# Patient Record
Sex: FEMALE | Race: BLACK OR AFRICAN AMERICAN | URBAN - METROPOLITAN AREA
[De-identification: names, ages, dates, MRNs, and addresses within clinical notes are randomized per-mention and may not be internally consistent; named-entity substitution may affect disease eponyms.]

---

## 2023-10-19 ENCOUNTER — OFFICE VISIT (OUTPATIENT)
Dept: OBGYN CLINIC | Age: 33
End: 2023-10-19

## 2023-10-19 VITALS
DIASTOLIC BLOOD PRESSURE: 70 MMHG | WEIGHT: 190.7 LBS | HEIGHT: 67 IN | SYSTOLIC BLOOD PRESSURE: 118 MMHG | BODY MASS INDEX: 29.93 KG/M2

## 2023-10-19 DIAGNOSIS — Z32.01 POSITIVE PREGNANCY TEST: ICD-10-CM

## 2023-10-19 DIAGNOSIS — N92.6 MISSED PERIOD: Primary | ICD-10-CM

## 2023-10-19 LAB
HCG, PREGNANCY, URINE, POC: POSITIVE
VALID INTERNAL CONTROL, POC: YES

## 2023-10-19 RX ORDER — ONDANSETRON HYDROCHLORIDE 8 MG/1
8 TABLET, FILM COATED ORAL EVERY 8 HOURS PRN
Qty: 20 TABLET | Refills: 1 | Status: SHIPPED | OUTPATIENT
Start: 2023-10-19

## 2023-10-19 RX ORDER — DEXTROAMPHETAMINE SACCHARATE, AMPHETAMINE ASPARTATE MONOHYDRATE, DEXTROAMPHETAMINE SULFATE AND AMPHETAMINE SULFATE 3.75; 3.75; 3.75; 3.75 MG/1; MG/1; MG/1; MG/1
15 CAPSULE, EXTENDED RELEASE ORAL EVERY MORNING
COMMUNITY

## 2023-10-19 RX ORDER — GABAPENTIN 400 MG/1
600 CAPSULE ORAL 3 TIMES DAILY
COMMUNITY
Start: 2022-08-12

## 2023-10-19 RX ORDER — TRAMADOL HYDROCHLORIDE 50 MG/1
50 TABLET ORAL EVERY 6 HOURS PRN
COMMUNITY

## 2023-10-19 RX ORDER — FLUOXETINE HYDROCHLORIDE 20 MG/1
20 CAPSULE ORAL
COMMUNITY
Start: 2022-12-22

## 2023-10-19 RX ORDER — CLONAZEPAM 0.5 MG/1
0.5 TABLET ORAL 2 TIMES DAILY PRN
COMMUNITY

## 2023-10-19 RX ORDER — FLUOXETINE 10 MG/1
10 CAPSULE ORAL
COMMUNITY
Start: 2022-12-22

## 2023-10-19 NOTE — PROGRESS NOTES
The patient is a 29 y.o. No obstetric history on file. who is seen for {Symptoms; vaginal:60981}. Onset was {numbers; 0-10:72563} {unit:11} ago. Symptoms have been {course:17::\"unchanged\"} since. Associated symptoms include:  {Symptoms; gyn associated:67543}. HISTORY:    No obstetric history on file. No LMP recorded. Sexual History:  {Sexual Hx:5163}  Contraception:  {Contraception Methods:5051}  No current outpatient medications on file prior to visit. No current facility-administered medications on file prior to visit. ROS:  Feeling well. No dyspnea or chest pain on exertion. No abdominal pain, change in bowel habits, black or bloody stools. No urinary tract symptoms. GYN ROS: {gyn ros:871704}. PHYSICAL EXAM:  There were no vitals taken for this visit. The patient appears well, alert, oriented x 3, in no distress. Lungs are clear. Heart RRR, no murmurs. Abdomen soft without tenderness, guarding, mass or organomegaly. Pelvic: {pelvic exam:731481::\"normal external genitalia, vulva, vagina, cervix, uterus and adnexa\"}. ASSESSMENT:  No diagnosis found. PLAN:  {Gyn plan:78941}  No orders of the defined types were placed in this encounter. Supervising physician is Dr. Bran Trotter Greater than 50% of the *** minute visit were spent in counseling to the above topics.

## 2023-10-19 NOTE — PROGRESS NOTES
The patient is a 29 y.o. No obstetric history on file. who is seen to discuss her new pregnancy. Pt denies any current unilateral pain, cramping, urinary symptoms, or vaginal bleeding. She is currently taking an over the counter PNV with DHA and folic acid. She is on a variety of medications for anxiety and musculoskeletal/sciatic pain. States in last pregnancy stayed on gabapentin. She reports nausea, requests zofran rx.   Recent preg 3/2023  Reports monthly and regular periods. LMP: 2023  TIFFANIE based off of LMP: 5/10/2024  GA today: 10w 6 days    HISTORY:    No obstetric history on file. Patient's last menstrual period was 2023 (approximate). Sexual History:  has sex with males  Contraception:  none    Current Outpatient Medications on File Prior to Visit   Medication Sig Dispense Refill    FLUoxetine (PROZAC) 10 MG capsule Take 1 capsule by mouth      FLUoxetine (PROZAC) 20 MG capsule Take 1 capsule by mouth      gabapentin (NEURONTIN) 400 MG capsule Take 600 mg by mouth 3 times daily. amphetamine-dextroamphetamine (ADDERALL XR) 15 MG extended release capsule Take 1 capsule by mouth every morning. Max Daily Amount: 15 mg      traMADol (ULTRAM) 50 MG tablet Take 1 tablet by mouth every 6 hours as needed for Pain. Max Daily Amount: 200 mg      Prenatal Vit w/Nw-Qtsmulcmn-JC (PNV PO) Take by mouth      clonazePAM (KLONOPIN) 0.5 MG tablet Take 1 tablet by mouth 2 times daily as needed. Max Daily Amount: 1 mg       No current facility-administered medications on file prior to visit. ROS:  Feeling well. No dyspnea or chest pain on exertion. No abdominal pain, change in bowel habits, black or bloody stools. No urinary tract symptoms. GYN ROS: she complains of early pregnancy. PHYSICAL EXAM:  Blood pressure 118/70, height 1.702 m (5' 7\"), weight 86.5 kg (190 lb 11.2 oz), last menstrual period 2023. The patient appears well, alert, oriented x 3, in no distress.   Exam

## 2023-10-25 NOTE — PROGRESS NOTES
Gayathir Cruz is a 29 y.o. [id number] who is seen for ultrasound to evaluate pregnancy due to advanced dates. Reports monthly and regular periods  No vaginal bleeding or unilateral pain    Last visit had advised pt to come off Adderall, neurontin, tramadol, klonopin. Pt states she has decreased the dose and is working on coming off  Advised pt of risks in first trimester with medication exposure. We disc many of these meds, including tramadol, not recommended in first trimester. She uses this for her severe sciatic pain and is weaning down and has an appt with PCP to discuss alternative treatments  Again advised pt to come off these medications. New OB talk is scheduled for 2023. LMP: 2023  TIFFANIE based off of LMP: 5/10/2024  GA today: 11w6d    Ultrasound findings from today:  +FHT= 149   CRL is not consistent with TIFFANIE determined by LMP. TIFFANIE determined by today's ultrasound is 06/10/2023 with a GA of 7w3d. YS visualized. Uterus is retroverted and homogenous   ROV visualized with CL  LOV visualized and appears within normal limits. Trace FF seen in PCDS        HISTORY:    OB History          3    Para   2    Term   2            AB        Living   2         SAB        IAB        Ectopic        Molar        Multiple        Live Births   2               Patient's last menstrual period was 2023 (approximate). Social History     Substance and Sexual Activity   Sexual Activity Yes    Partners: Male    Birth control/protection: None        ROS:  Feeling well. No dyspnea or chest pain on exertion. No abdominal pain, change in bowel habits, black or bloody stools. No urinary tract symptoms. OB ROS: No contractions, vaginal bleeding , and vaginal leaking of fluid . PHYSICAL EXAM:  Blood pressure (!) 100/58, weight 88 kg (194 lb), last menstrual period 2023, not currently breastfeeding. The patient appears well, alert, oriented x 3.     Exam deferred, talk

## 2023-10-26 ENCOUNTER — ROUTINE PRENATAL (OUTPATIENT)
Dept: OBGYN CLINIC | Age: 33
End: 2023-10-26
Payer: MEDICAID

## 2023-10-26 VITALS — DIASTOLIC BLOOD PRESSURE: 58 MMHG | BODY MASS INDEX: 30.38 KG/M2 | WEIGHT: 194 LBS | SYSTOLIC BLOOD PRESSURE: 100 MMHG

## 2023-10-26 DIAGNOSIS — O36.80X0 ENCOUNTER TO DETERMINE FETAL VIABILITY OF PREGNANCY, SINGLE OR UNSPECIFIED FETUS: Primary | ICD-10-CM

## 2023-10-26 DIAGNOSIS — Z3A.01 7 WEEKS GESTATION OF PREGNANCY: ICD-10-CM

## 2023-10-26 DIAGNOSIS — Z79.899 MEDICATION MANAGEMENT: ICD-10-CM

## 2023-10-26 DIAGNOSIS — N92.6 MISSED PERIOD: ICD-10-CM

## 2023-10-26 PROCEDURE — 99213 OFFICE O/P EST LOW 20 MIN: CPT | Performed by: NURSE PRACTITIONER

## 2023-10-26 PROCEDURE — 76817 TRANSVAGINAL US OBSTETRIC: CPT | Performed by: NURSE PRACTITIONER

## 2023-10-26 RX ORDER — GABAPENTIN 600 MG/1
600 TABLET ORAL 3 TIMES DAILY
COMMUNITY

## 2023-11-07 ENCOUNTER — OFFICE VISIT (OUTPATIENT)
Dept: ORTHOPEDIC SURGERY | Age: 33
End: 2023-11-07

## 2023-11-07 VITALS — BODY MASS INDEX: 32.14 KG/M2 | HEIGHT: 66 IN | WEIGHT: 200 LBS

## 2023-11-07 DIAGNOSIS — M25.461 EFFUSION OF RIGHT KNEE: Primary | ICD-10-CM

## 2023-11-07 DIAGNOSIS — M25.561 RIGHT KNEE PAIN, UNSPECIFIED CHRONICITY: ICD-10-CM

## 2023-11-07 NOTE — PROGRESS NOTES
still in the first trimester we could consider an injection if she gets recurrent effusions when she is in the second or third trimester. We also discussed knee sleeve and compression and provided a home exercise quad strengthening program.  She elected to proceed with the aspiration today. After verbal informed consent was obtained the right knee was injected from a superior lateral  approach with 3 cc of 1% lidocaine on a 25-gauge needle through the skin and subcutaneous tissue down to the capsule. The knee was then aspirated from the superior lateral approach with an 18-gauge needle and a 60 cc syringe. 25  cc of normal-appearing synovial fluid was aspirated,. A compressive wrap was applied. The patient tolerated the procedure well and was given postinjection flare precautions. Marialuisa Hernández MD, [address] [facility]

## 2023-11-09 ENCOUNTER — INITIAL PRENATAL (OUTPATIENT)
Dept: OBGYN CLINIC | Age: 33
End: 2023-11-09
Payer: MEDICAID

## 2023-11-09 VITALS — BODY MASS INDEX: 31.6 KG/M2 | HEIGHT: 66 IN | WEIGHT: 196.6 LBS

## 2023-11-09 DIAGNOSIS — Z34.81 PRENATAL CARE, SUBSEQUENT PREGNANCY, FIRST TRIMESTER: ICD-10-CM

## 2023-11-09 DIAGNOSIS — O09.899 SHORT INTERVAL BETWEEN PREGNANCIES AFFECTING PREGNANCY, ANTEPARTUM: ICD-10-CM

## 2023-11-09 DIAGNOSIS — Z86.19 HISTORY OF SYPHILIS: ICD-10-CM

## 2023-11-09 DIAGNOSIS — Z72.0 CURRENT EVERY DAY NICOTINE VAPING: ICD-10-CM

## 2023-11-09 DIAGNOSIS — O26.841 UTERINE SIZE-DATE DISCREPANCY IN FIRST TRIMESTER: ICD-10-CM

## 2023-11-09 DIAGNOSIS — Z3A.09 9 WEEKS GESTATION OF PREGNANCY: ICD-10-CM

## 2023-11-09 DIAGNOSIS — F32.A ANXIETY AND DEPRESSION: ICD-10-CM

## 2023-11-09 DIAGNOSIS — O09.891 MEDICATION EXPOSURE DURING FIRST TRIMESTER OF PREGNANCY: Primary | ICD-10-CM

## 2023-11-09 DIAGNOSIS — F41.9 ANXIETY AND DEPRESSION: ICD-10-CM

## 2023-11-09 PROBLEM — M54.30 SCIATIC PAIN: Status: ACTIVE | Noted: 2023-11-09

## 2023-11-09 PROBLEM — F98.8 ADD (ATTENTION DEFICIT DISORDER): Status: ACTIVE | Noted: 2023-11-09

## 2023-11-09 PROBLEM — O09.90 HIGH-RISK PREGNANCY: Status: ACTIVE | Noted: 2023-11-09

## 2023-11-09 LAB
ABO + RH BLD: NORMAL
AMPHET UR QL SCN: NEGATIVE
BARBITURATES UR QL SCN: NEGATIVE
BASOPHILS # BLD: 0 K/UL (ref 0–0.2)
BASOPHILS NFR BLD: 0 % (ref 0–2)
BENZODIAZ UR QL: NEGATIVE
BLOOD GROUP ANTIBODIES SERPL: NORMAL
CANNABINOIDS UR QL SCN: NEGATIVE
COCAINE UR QL SCN: NEGATIVE
DIFFERENTIAL METHOD BLD: ABNORMAL
EOSINOPHIL # BLD: 0.1 K/UL (ref 0–0.8)
EOSINOPHIL NFR BLD: 2 % (ref 0.5–7.8)
ERYTHROCYTE [DISTWIDTH] IN BLOOD BY AUTOMATED COUNT: 13.5 % (ref 11.9–14.6)
HBV SURFACE AG SER QL: NONREACTIVE
HCT VFR BLD AUTO: 35.4 % (ref 35.8–46.3)
HCV AB SER QL: REACTIVE
HGB BLD-MCNC: 11.5 G/DL (ref 11.7–15.4)
HIV 1+2 AB+HIV1 P24 AG SERPL QL IA: NONREACTIVE
HIV 1/2 RESULT COMMENT: NORMAL
IMM GRANULOCYTES # BLD AUTO: 0 K/UL (ref 0–0.5)
IMM GRANULOCYTES NFR BLD AUTO: 0 % (ref 0–5)
LYMPHOCYTES # BLD: 2.3 K/UL (ref 0.5–4.6)
LYMPHOCYTES NFR BLD: 38 % (ref 13–44)
MCH RBC QN AUTO: 30.9 PG (ref 26.1–32.9)
MCHC RBC AUTO-ENTMCNC: 32.5 G/DL (ref 31.4–35)
MCV RBC AUTO: 95.2 FL (ref 82–102)
METHADONE UR QL: NEGATIVE
MONOCYTES # BLD: 0.4 K/UL (ref 0.1–1.3)
MONOCYTES NFR BLD: 7 % (ref 4–12)
NEUTS SEG # BLD: 3.1 K/UL (ref 1.7–8.2)
NEUTS SEG NFR BLD: 53 % (ref 43–78)
NRBC # BLD: 0 K/UL (ref 0–0.2)
OPIATES UR QL: NEGATIVE
PCP UR QL: NEGATIVE
PLATELET # BLD AUTO: 288 K/UL (ref 150–450)
PMV BLD AUTO: 9.7 FL (ref 9.4–12.3)
RBC # BLD AUTO: 3.72 M/UL (ref 4.05–5.2)
RUBV IGG SERPL IA-ACNC: 160.8 IU/ML
WBC # BLD AUTO: 6 K/UL (ref 4.3–11.1)

## 2023-11-09 PROCEDURE — 76817 TRANSVAGINAL US OBSTETRIC: CPT | Performed by: OBSTETRICS & GYNECOLOGY

## 2023-11-09 NOTE — PROGRESS NOTES
Lauren Moctezuma G3, P2 presents to the office today for NOB talk and ultrasound. EDC is 6/10/24 based off of US. Patient education was discussed including: nutrition, appropriate weight gain, diet, exercise, travel, hospital classes, breastfeeding/lactation services, flu vaccine, Tdap, glucola, GBS, and Corona Virus and Zika precautions. Genetic testing discussed in depth and patient elects NIPT/Carrier screen. Patients past medical history is significant for patient on multiple medications, that she has been taking throughout first trimester. She has appointment at PCP next week to discuss. Referral to [facility] sent for medication exposure. She was advised at last visit to stop all but Prozac and Zofran, but she is still taking all meds. She is advised today, that these medications are not considered safe for pregnancy. Delivered G2 in March of this year. States pregnancies have been normal.  Daily nicotine vaping-encouraged to quit. She is to return to the office in 3 weeks for NOB exam. All questions answered and she voiced full understanding. She is encouraged to call the office with any questions or concerns.

## 2023-11-10 LAB
RPR SER QL: NONREACTIVE
VZV IGG SER IA-ACNC: 1838 INDEX

## 2023-11-12 LAB
BACTERIA SPEC CULT: NORMAL
BACTERIA SPEC CULT: NORMAL
SERVICE CMNT-IMP: NORMAL

## 2023-11-13 LAB
HGB A MFR BLD: 97.3 % (ref 96.4–98.8)
HGB A2 MFR BLD COLUMN CHROM: 2.7 % (ref 1.8–3.2)
HGB F MFR BLD: 0 % (ref 0–2)
HGB FRACT BLD-IMP: NORMAL
HGB S MFR BLD: 0 %

## 2023-11-14 PROBLEM — R76.8 FALSE POSITIVE SEROLOGICAL TEST FOR HEPATITIS C: Status: ACTIVE | Noted: 2023-11-14

## 2023-11-14 LAB
HCV RNA SERPL NAA+PROBE-ACNC: NORMAL IU/ML
TEST INFORMATION: NORMAL

## 2023-11-28 ENCOUNTER — ROUTINE PRENATAL (OUTPATIENT)
Dept: OBGYN CLINIC | Age: 33
End: 2023-11-28
Payer: MEDICAID

## 2023-11-28 VITALS — DIASTOLIC BLOOD PRESSURE: 60 MMHG | SYSTOLIC BLOOD PRESSURE: 98 MMHG

## 2023-11-28 DIAGNOSIS — Z3A.12 12 WEEKS GESTATION OF PREGNANCY: ICD-10-CM

## 2023-11-28 DIAGNOSIS — O09.891 MEDICATION EXPOSURE DURING FIRST TRIMESTER OF PREGNANCY: Primary | ICD-10-CM

## 2023-11-28 DIAGNOSIS — O09.899 SHORT INTERVAL BETWEEN PREGNANCIES AFFECTING PREGNANCY, ANTEPARTUM: ICD-10-CM

## 2023-11-28 DIAGNOSIS — F98.8 ATTENTION DEFICIT DISORDER, UNSPECIFIED HYPERACTIVITY PRESENCE: ICD-10-CM

## 2023-11-28 DIAGNOSIS — O09.91 HIGH-RISK PREGNANCY IN FIRST TRIMESTER: ICD-10-CM

## 2023-11-28 DIAGNOSIS — M54.9 BACK PAIN AFFECTING PREGNANCY IN FIRST TRIMESTER: ICD-10-CM

## 2023-11-28 DIAGNOSIS — O99.331: ICD-10-CM

## 2023-11-28 DIAGNOSIS — O99.340 MATERNAL MENTAL DISORDER, ANTEPARTUM: ICD-10-CM

## 2023-11-28 DIAGNOSIS — O99.891 BACK PAIN AFFECTING PREGNANCY IN FIRST TRIMESTER: ICD-10-CM

## 2023-11-28 PROCEDURE — 76801 OB US < 14 WKS SINGLE FETUS: CPT | Performed by: OBSTETRICS & GYNECOLOGY

## 2023-11-28 PROCEDURE — 99204 OFFICE O/P NEW MOD 45 MIN: CPT | Performed by: OBSTETRICS & GYNECOLOGY

## 2023-11-28 PROCEDURE — 76813 OB US NUCHAL MEAS 1 GEST: CPT | Performed by: OBSTETRICS & GYNECOLOGY

## 2023-11-28 ASSESSMENT — PATIENT HEALTH QUESTIONNAIRE - PHQ9
SUM OF ALL RESPONSES TO PHQ QUESTIONS 1-9: 0
SUM OF ALL RESPONSES TO PHQ QUESTIONS 1-9: 0
2. FEELING DOWN, DEPRESSED OR HOPELESS: 0
SUM OF ALL RESPONSES TO PHQ9 QUESTIONS 1 & 2: 0
SUM OF ALL RESPONSES TO PHQ QUESTIONS 1-9: 0
SUM OF ALL RESPONSES TO PHQ QUESTIONS 1-9: 0
1. LITTLE INTEREST OR PLEASURE IN DOING THINGS: 0

## 2023-11-28 NOTE — PATIENT INSTRUCTIONS
Pregnancy Considerations   Tramadol crosses the placenta. Maternal use of opioids may be associated with poor fetal growth, stillbirth, and  delivery (CDC [[place] 6916]).  seizures and fetal death have been reported following maternal use of tramadol. Opioids used as part of obstetric analgesia/anesthesia during labor and delivery may temporarily affect the fetal heart rate (ACOG 2019).  abstinence syndrome (JODI)/ opioid withdrawal syndrome (NOWS) may occur following prolonged in utero exposure to opioids (CDC [[place] 9140]). JODI/NOWS may be life-threatening if not recognized and treated and requires management according to protocols developed by neonatology experts. Presentation of symptoms varies by opioid characteristics (eg, immediate release, sustained release), time of last dose prior to delivery, drug metabolism (maternal, placental, and infant), net placental transfer, as well as other factors (AAP [Yanelis 2012]; AAP [Carlos 0460]). Clinical signs characteristic of withdrawal following in utero opioid exposure include excessive crying or easily irritable, fragmented sleep (<2 to 3 hours after feeding), tremors, increased muscle tone, or GI dysfunction (hyperphagia, poor feeding, feeding intolerance, watery or loose stools) (Luis Rich 2022). JODI/NOWS occurs following chronic opioid exposure and would not be expected following the use of opioids at delivery (AAP [Carlos 2020]). Monitor infants of mothers on long-term/chronic opioid therapy for symptoms of withdrawal. Symptom onset reflects the half-life of the opioid used. Monitor infants for at least 3 days following exposure to immediate-release opioids; monitor for at least 4 to 7 days following exposure to sustained-release opioids (AAP [Carlos 2277]; [facility] [Justina 8108]). Monitor newborns for excess sedation and respiratory depression when opioids are used during labor.   When opioids are needed to treat acute pain in

## 2023-11-28 NOTE — PROGRESS NOTES
[address] [place] [address] FETAL MEDICINE    [address], [address] [place] [address]  P- [phone] [phone]         MFM Consultation    Presents for evaluation of the following chief complaint(s):   Chief Complaint   Patient presents with    High Risk Pregnancy     Med exposure, Anxiety/Depression, Short interval pregnancy    Pregnancy Ultrasound     NT         [address] (1990) is a 29 y.o. Dois Lade at 12w1d with 6/10/2024, by Ultrasound. Patient is a SAHM. Pt is scheduled to see Primary OB ([address]) on 11/30/23. No recent HA's. Has trace edema BLE. No bleeding or LOF. No contractions or cramping. No vaginal pressure recently. Pt c/o right knee pain (3 prior knee surgeries). Pt was see at [facility] on 11/7/23. Effusion noted and aspirated. Per  at [facility] if recurrent effusions can consider injections in 2nd and 3rd trimester. Mood evaluated today based on discussion with pt and PHQ screen. 11/28/2023     2:25 PM   PHQ-9    Little interest or pleasure in doing things 0   Feeling down, depressed, or hopeless 0   PHQ-2 Score 0   PHQ-9 Total Score 0      Mood Reassuring today    Personal and family history reviewed and updated as indicated. Addressed normal pregnancy complaints, reassured and offered suggestions for care  Reviewed gestational age precautions and activity goals/limitations  Nutritional counseling as well as specific goals based on current maternal and fetal status  Options for GERD, constipation, other common complaints reviewed. Reviewed gestational age appropriate preventive care regarding communicable disease transmission and vaccines as appropriate (including flu, TDaP >28wk, RSV 32-36wk, and COVID.)  Mood counseling today    Exam:     Vitals:    11/28/23 1518   BP: 66/74      Applicable labs reviewed. Please see formal ultrasound report under imaging tab.       ASSESSMENT/PLAN:  Patient Active Problem List    Diagnosis Date Noted    False

## 2023-11-30 ENCOUNTER — ROUTINE PRENATAL (OUTPATIENT)
Dept: OBGYN CLINIC | Age: 33
End: 2023-11-30
Payer: MEDICAID

## 2023-11-30 VITALS — WEIGHT: 192.2 LBS | SYSTOLIC BLOOD PRESSURE: 128 MMHG | DIASTOLIC BLOOD PRESSURE: 74 MMHG | BODY MASS INDEX: 31.02 KG/M2

## 2023-11-30 DIAGNOSIS — O36.8390 UNABLE TO HEAR FETAL HEART TONES AS REASON FOR ULTRASOUND SCAN: Primary | ICD-10-CM

## 2023-11-30 DIAGNOSIS — Z11.3 SCREENING EXAMINATION FOR STD (SEXUALLY TRANSMITTED DISEASE): ICD-10-CM

## 2023-11-30 DIAGNOSIS — Z34.81 PRENATAL CARE, SUBSEQUENT PREGNANCY IN FIRST TRIMESTER: ICD-10-CM

## 2023-11-30 DIAGNOSIS — Z3A.12 12 WEEKS GESTATION OF PREGNANCY: ICD-10-CM

## 2023-11-30 DIAGNOSIS — Z13.89 SCREENING FOR GENITOURINARY CONDITION: ICD-10-CM

## 2023-11-30 LAB
GLUCOSE URINE, POC: NEGATIVE
PROTEIN,URINE, POC: NORMAL

## 2023-11-30 PROCEDURE — 99214 OFFICE O/P EST MOD 30 MIN: CPT | Performed by: OBSTETRICS & GYNECOLOGY

## 2023-11-30 PROCEDURE — 76801 OB US < 14 WKS SINGLE FETUS: CPT | Performed by: OBSTETRICS & GYNECOLOGY

## 2023-11-30 PROCEDURE — 81002 URINALYSIS NONAUTO W/O SCOPE: CPT | Performed by: OBSTETRICS & GYNECOLOGY

## 2023-11-30 NOTE — PROGRESS NOTES
FHTS
accepted testing. Discussed diet, exercise and recommended weight gain. All questions answered. Encounter Diagnoses   Name Primary? Prenatal care, subsequent pregnancy in first trimester     12 weeks gestation of pregnancy     Screening for genitourinary condition     Unable to hear fetal heart tones as reason for ultrasound scan Yes     No follow-up provider specified.

## 2023-12-03 LAB
C TRACH RRNA SPEC QL NAA+PROBE: NEGATIVE
N GONORRHOEA RRNA SPEC QL NAA+PROBE: NEGATIVE
SPECIMEN SOURCE: NORMAL
T VAGINALIS RRNA SPEC QL NAA+PROBE: NEGATIVE

## 2023-12-12 LAB
Lab: NEGATIVE
Lab: NORMAL
NTRA CYSTIC FIBROSIS: NEGATIVE
NTRA DUCHENNE/BECKER MUSCULAR DYSTROPHY: NEGATIVE
NTRA FETAL FRACTION: NORMAL
NTRA FRAGILE X SYNDROME: NEGATIVE
NTRA GENDER OF FETUS: NORMAL
NTRA MONOSOMY X AGE-BASED RISK TEXT: NORMAL
NTRA MONOSOMY X RESULT TEXT: NORMAL
NTRA MONOSOMY X RISK SCORE TEXT: NORMAL
NTRA SPINAL MUSCULAR ATROPHY: NEGATIVE
NTRA TRIPLOIDY RESULT TEXT: NORMAL
NTRA TRISOMY 13 AGE-BASED RISK TEXT: NORMAL
NTRA TRISOMY 13 RESULT TEXT: NORMAL
NTRA TRISOMY 13 RISK SCORE TEXT: NORMAL
NTRA TRISOMY 18 AGE-BASED RISK TEXT: NORMAL
NTRA TRISOMY 18 RESULT TEXT: NORMAL
NTRA TRISOMY 18 RISK SCORE TEXT: NORMAL
NTRA TRISOMY 21 AGE-BASED RISK TEXT: NORMAL
NTRA TRISOMY 21 RESULT TEXT: NORMAL
NTRA TRISOMY 21 RISK SCORE TEXT: NORMAL

## 2023-12-27 ENCOUNTER — ROUTINE PRENATAL (OUTPATIENT)
Dept: OBGYN CLINIC | Age: 33
End: 2023-12-27
Payer: MEDICAID

## 2023-12-27 VITALS — SYSTOLIC BLOOD PRESSURE: 110 MMHG | WEIGHT: 190 LBS | DIASTOLIC BLOOD PRESSURE: 62 MMHG | BODY MASS INDEX: 30.67 KG/M2

## 2023-12-27 DIAGNOSIS — Z3A.16 16 WEEKS GESTATION OF PREGNANCY: ICD-10-CM

## 2023-12-27 DIAGNOSIS — O09.92 HIGH-RISK PREGNANCY IN SECOND TRIMESTER: Primary | ICD-10-CM

## 2023-12-27 DIAGNOSIS — Z13.89 SCREENING FOR GENITOURINARY CONDITION: ICD-10-CM

## 2023-12-27 LAB
GLUCOSE URINE, POC: NEGATIVE
PROTEIN,URINE, POC: NEGATIVE

## 2023-12-27 PROCEDURE — 81002 URINALYSIS NONAUTO W/O SCOPE: CPT | Performed by: OBSTETRICS & GYNECOLOGY

## 2023-12-27 PROCEDURE — 99213 OFFICE O/P EST LOW 20 MIN: CPT | Performed by: OBSTETRICS & GYNECOLOGY

## 2023-12-28 ENCOUNTER — ROUTINE PRENATAL (OUTPATIENT)
Dept: OBGYN CLINIC | Age: 33
End: 2023-12-28
Payer: MEDICAID

## 2023-12-28 VITALS — SYSTOLIC BLOOD PRESSURE: 118 MMHG | DIASTOLIC BLOOD PRESSURE: 75 MMHG

## 2023-12-28 DIAGNOSIS — M54.40 CHRONIC LOW BACK PAIN WITH SCIATICA, SCIATICA LATERALITY UNSPECIFIED, UNSPECIFIED BACK PAIN LATERALITY: ICD-10-CM

## 2023-12-28 DIAGNOSIS — G89.29 CHRONIC LOW BACK PAIN WITH SCIATICA, SCIATICA LATERALITY UNSPECIFIED, UNSPECIFIED BACK PAIN LATERALITY: ICD-10-CM

## 2023-12-28 DIAGNOSIS — O99.340 MATERNAL MENTAL DISORDER, ANTEPARTUM: ICD-10-CM

## 2023-12-28 DIAGNOSIS — O09.891 MEDICATION EXPOSURE DURING FIRST TRIMESTER OF PREGNANCY: ICD-10-CM

## 2023-12-28 DIAGNOSIS — O99.212 OBESITY AFFECTING PREGNANCY IN SECOND TRIMESTER, UNSPECIFIED OBESITY TYPE: ICD-10-CM

## 2023-12-28 DIAGNOSIS — E66.9 CLASS 1 OBESITY: ICD-10-CM

## 2023-12-28 DIAGNOSIS — F11.20 POLYSUBSTANCE DEPENDENCE INCLUDING OPIOID TYPE DRUG, CONTINUOUS USE (HCC): ICD-10-CM

## 2023-12-28 DIAGNOSIS — O99.331: ICD-10-CM

## 2023-12-28 DIAGNOSIS — O09.899 SHORT INTERVAL BETWEEN PREGNANCIES AFFECTING PREGNANCY, ANTEPARTUM: ICD-10-CM

## 2023-12-28 DIAGNOSIS — M54.9 BACK PAIN AFFECTING PREGNANCY IN FIRST TRIMESTER: ICD-10-CM

## 2023-12-28 DIAGNOSIS — R76.8 FALSE POSITIVE SEROLOGICAL TEST FOR HEPATITIS C: ICD-10-CM

## 2023-12-28 DIAGNOSIS — F19.20 POLYSUBSTANCE DEPENDENCE INCLUDING OPIOID TYPE DRUG, CONTINUOUS USE (HCC): ICD-10-CM

## 2023-12-28 DIAGNOSIS — F98.8 ATTENTION DEFICIT DISORDER, UNSPECIFIED HYPERACTIVITY PRESENCE: ICD-10-CM

## 2023-12-28 DIAGNOSIS — Z13.32 ENCOUNTER FOR SCREENING FOR MATERNAL DEPRESSION: Primary | ICD-10-CM

## 2023-12-28 DIAGNOSIS — O09.92 HIGH-RISK PREGNANCY IN SECOND TRIMESTER: ICD-10-CM

## 2023-12-28 DIAGNOSIS — O99.891 BACK PAIN AFFECTING PREGNANCY IN FIRST TRIMESTER: ICD-10-CM

## 2023-12-28 DIAGNOSIS — O09.892 SHORT INTERVAL BETWEEN PREGNANCIES AFFECTING PREGNANCY IN SECOND TRIMESTER, ANTEPARTUM: ICD-10-CM

## 2023-12-28 DIAGNOSIS — Z3A.16 16 WEEKS GESTATION OF PREGNANCY: ICD-10-CM

## 2023-12-28 DIAGNOSIS — Z86.19 HISTORY OF SYPHILIS: ICD-10-CM

## 2023-12-28 PROBLEM — M25.551 PAIN OF RIGHT HIP JOINT: Status: ACTIVE | Noted: 2021-07-30

## 2023-12-28 PROBLEM — F39 MOOD DISORDER (HCC): Status: ACTIVE | Noted: 2020-01-03

## 2023-12-28 PROBLEM — F19.11 HISTORY OF DRUG ABUSE (HCC): Status: ACTIVE | Noted: 2020-11-02

## 2023-12-28 PROBLEM — M54.50 CHRONIC LOW BACK PAIN: Status: ACTIVE | Noted: 2020-01-03

## 2023-12-28 PROCEDURE — 99215 OFFICE O/P EST HI 40 MIN: CPT | Performed by: OBSTETRICS & GYNECOLOGY

## 2023-12-28 PROCEDURE — 76805 OB US >/= 14 WKS SNGL FETUS: CPT | Performed by: OBSTETRICS & GYNECOLOGY

## 2023-12-28 PROCEDURE — 96127 BRIEF EMOTIONAL/BEHAV ASSMT: CPT | Performed by: OBSTETRICS & GYNECOLOGY

## 2023-12-28 PROCEDURE — 99415 PROLNG CLIN STAFF SVC 1ST HR: CPT | Performed by: OBSTETRICS & GYNECOLOGY

## 2023-12-28 RX ORDER — FAMOTIDINE 20 MG/1
20 TABLET, FILM COATED ORAL 2 TIMES DAILY
COMMUNITY

## 2023-12-28 RX ORDER — HYDROCODONE BITARTRATE AND ACETAMINOPHEN 5; 325 MG/1; MG/1
1 TABLET ORAL EVERY 8 HOURS PRN
COMMUNITY
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
## 2023-12-29 PROBLEM — F11.20 POLYSUBSTANCE DEPENDENCE INCLUDING OPIOID TYPE DRUG, CONTINUOUS USE (HCC): Status: ACTIVE | Noted: 2023-12-29

## 2023-12-29 PROBLEM — O09.892 SHORT INTERVAL BETWEEN PREGNANCIES AFFECTING PREGNANCY IN SECOND TRIMESTER, ANTEPARTUM: Status: ACTIVE | Noted: 2023-12-29

## 2023-12-29 PROBLEM — F19.20 POLYSUBSTANCE DEPENDENCE INCLUDING OPIOID TYPE DRUG, CONTINUOUS USE (HCC): Status: ACTIVE | Noted: 2023-12-29

## 2023-12-29 NOTE — PATIENT INSTRUCTIONS
If you have pelvic or back pain and discomfort that is NOT cramping or contractions, you can consider these things to help. Maternity support belt/K-tape (online instructional videos at www.[email or web address])  Heat/Cold (whichever soothes more)  Massage  Tylenol/biofreeze/similar  Pelvic Floor PT   Aquatic therapy   Chiropractic care  TENS unit can be considered. Resources for Depression/Anxiety  Postpartum Support International (PSI). PSI Warmline:  5-445-514-4PPD (1717). WWW. POSTPARTUM. NET    Mom's IMPACTT  [email or web address]

## 2023-12-29 NOTE — ASSESSMENT & PLAN NOTE
Short interval of pregnancy with less than 12 months between delivery and conception may be associated with maternal or fetal complications. Greatest risks are seen with interpregnancy interval (IPI) less than 6 months. 30 percent increase in risk of maternal anemia after IPI less than 6 months   In a 2006 meta-analysis including 10 studies, IPI less than 6 months was associated with a 60 percent increase in risk of low birth weight when compared with IPI of 18 to 23 months (pooled adjusted OR 1.61, 95% CI 1.39-1.86)  Increased risk of PPROM with IPI of 6 to 14 months and IPI less than 18 months   IPI ? 6 months were at 3.6-fold increased risk for early spontaneous PTB (less than 34 weeks), no association with late  birth  IPI of less than six months was associated with increased risk for placental abruption (OR 1.8, 95%CI 1.2-2.7)  Three-fold increase in risk of uterine rupture among women undergoing TOLAC with short inter-delivery interval up to 18 months

## 2024-01-24 ENCOUNTER — ROUTINE PRENATAL (OUTPATIENT)
Dept: OBGYN CLINIC | Age: 34
End: 2024-01-24
Payer: MEDICAID

## 2024-01-24 VITALS — DIASTOLIC BLOOD PRESSURE: 70 MMHG | WEIGHT: 196.2 LBS | SYSTOLIC BLOOD PRESSURE: 114 MMHG | BODY MASS INDEX: 31.67 KG/M2

## 2024-01-24 DIAGNOSIS — Z3A.20 20 WEEKS GESTATION OF PREGNANCY: ICD-10-CM

## 2024-01-24 DIAGNOSIS — F11.20 POLYSUBSTANCE DEPENDENCE INCLUDING OPIOID TYPE DRUG, CONTINUOUS USE (HCC): ICD-10-CM

## 2024-01-24 DIAGNOSIS — F19.11 HISTORY OF DRUG ABUSE (HCC): ICD-10-CM

## 2024-01-24 DIAGNOSIS — F19.20 POLYSUBSTANCE DEPENDENCE INCLUDING OPIOID TYPE DRUG, CONTINUOUS USE (HCC): ICD-10-CM

## 2024-01-24 DIAGNOSIS — O09.92 HIGH-RISK PREGNANCY IN SECOND TRIMESTER: Primary | ICD-10-CM

## 2024-01-24 DIAGNOSIS — Z13.89 SCREENING FOR GENITOURINARY CONDITION: ICD-10-CM

## 2024-01-24 LAB
AMPHET UR QL SCN: POSITIVE
BARBITURATES UR QL SCN: NEGATIVE
BENZODIAZ UR QL: POSITIVE
CANNABINOIDS UR QL SCN: POSITIVE
COCAINE UR QL SCN: NEGATIVE
GLUCOSE URINE, POC: NEGATIVE
METHADONE UR QL: NEGATIVE
OPIATES UR QL: POSITIVE
PCP UR QL: NEGATIVE
PROTEIN,URINE, POC: NEGATIVE

## 2024-01-24 PROCEDURE — 99213 OFFICE O/P EST LOW 20 MIN: CPT | Performed by: OBSTETRICS & GYNECOLOGY

## 2024-01-24 PROCEDURE — 81002 URINALYSIS NONAUTO W/O SCOPE: CPT | Performed by: OBSTETRICS & GYNECOLOGY

## 2024-01-24 NOTE — PROGRESS NOTES
Repeat UDS was sent from urine sample today- per [facility].   
has the potential to be dangerous as it can be a cause of seizures. The patient does not wish to stop taking clonazepam at this time. She was also counseled that polypharmacy is known to increase the risk for JODI. Recommend referral to NICU for consult at around 32 weeks gestation.    Pt with long history of ADHD. This has been well-controlled prior to pregnancy with adderall.    Patient currently uses many nonpharmacologic interventions without sufficient control to perform ADL sufficiently.   Most data on this class of medication is combined with research of illicit amphetamine use this clouds the ability to identify true impact on fetal development. Risk of growth lag, unknown long-term impact on fetal brain reviewed as well as potential small increase in risk of abdominal wall and limb reduction defects.. All questions answered.     Recommend prescribing for all these meds by either behavioral health or primary care provider in order to maintain consistent surveillance as will only see MFM sporadically.     12/28/23 [facility]: Taking medication for Chronic back and knee pain. See polysubstance overview.             Anxiety and Depression affecting pregnancy  11/09/2023     Takes Prozac 30 mg daily.  Klonopin 0.5 mg PRN BID.    11/28/23 [facility]: Reports stable mood.           ADD (attention deficit disorder) 11/09/2023     Taking Adderall XR 15 mg q day.       High-risk pregnancy in second trimester 11/09/2023     NIPT/Carrier screen:low risk   GTT:  Flu:  Tdap:    11/28/23 [facility]: Normal NT and nasal bone. For full details review formal ultrasound report. Genetic counseling done by MD. Pt desires NIPT and Carrier Screen, collect at [place] with Primary OB.    12/28/23 [facility]:  +FCA, IUP          Short interval between pregnancies affecting pregnancy, antepartum 11/09/2023     Vaginal delivery 3/2023      Back pain complicating pregnancy, second trimester 11/09/2023 11/28/23 [facility]: Takes Gabapentin 600 mg TID.        History

## 2024-01-25 NOTE — RESULT ENCOUNTER NOTE
Labs are abnormal she has Rx for above except THC.  Need to be sure understands policy and follow up on THC confirmation

## 2024-01-27 LAB
CANNABINOIDS UR QL CFM: POSITIVE
THC UR CFM-MCNC: 61 NG/ML

## 2024-01-29 ENCOUNTER — TELEPHONE (OUTPATIENT)
Dept: OBGYN CLINIC | Age: 34
End: 2024-01-29

## 2024-01-29 DIAGNOSIS — F12.90 MARIJUANA USE: Primary | ICD-10-CM

## 2024-01-30 ENCOUNTER — ROUTINE PRENATAL (OUTPATIENT)
Dept: OBGYN CLINIC | Age: 34
End: 2024-01-30
Payer: MEDICAID

## 2024-01-30 VITALS — SYSTOLIC BLOOD PRESSURE: 124 MMHG | DIASTOLIC BLOOD PRESSURE: 78 MMHG

## 2024-01-30 DIAGNOSIS — F11.20 POLYSUBSTANCE DEPENDENCE INCLUDING OPIOID TYPE DRUG, CONTINUOUS USE (HCC): ICD-10-CM

## 2024-01-30 DIAGNOSIS — O99.320 MARIJUANA USE DURING PREGNANCY: ICD-10-CM

## 2024-01-30 DIAGNOSIS — O99.212 OBESITY AFFECTING PREGNANCY IN SECOND TRIMESTER, UNSPECIFIED OBESITY TYPE: ICD-10-CM

## 2024-01-30 DIAGNOSIS — M54.9 BACK PAIN COMPLICATING PREGNANCY, SECOND TRIMESTER: ICD-10-CM

## 2024-01-30 DIAGNOSIS — O99.331: ICD-10-CM

## 2024-01-30 DIAGNOSIS — O09.892 SHORT INTERVAL BETWEEN PREGNANCIES AFFECTING PREGNANCY IN SECOND TRIMESTER, ANTEPARTUM: ICD-10-CM

## 2024-01-30 DIAGNOSIS — F12.90 MARIJUANA USE DURING PREGNANCY: ICD-10-CM

## 2024-01-30 DIAGNOSIS — Z3A.21 21 WEEKS GESTATION OF PREGNANCY: ICD-10-CM

## 2024-01-30 DIAGNOSIS — O99.891 BACK PAIN COMPLICATING PREGNANCY, SECOND TRIMESTER: ICD-10-CM

## 2024-01-30 DIAGNOSIS — Z13.32 ENCOUNTER FOR SCREENING FOR MATERNAL DEPRESSION: ICD-10-CM

## 2024-01-30 DIAGNOSIS — O09.891 MEDICATION EXPOSURE DURING FIRST TRIMESTER OF PREGNANCY: ICD-10-CM

## 2024-01-30 DIAGNOSIS — O09.899 SHORT INTERVAL BETWEEN PREGNANCIES AFFECTING PREGNANCY, ANTEPARTUM: ICD-10-CM

## 2024-01-30 DIAGNOSIS — O09.92 HIGH-RISK PREGNANCY IN SECOND TRIMESTER: Primary | ICD-10-CM

## 2024-01-30 DIAGNOSIS — F19.20 POLYSUBSTANCE DEPENDENCE INCLUDING OPIOID TYPE DRUG, CONTINUOUS USE (HCC): ICD-10-CM

## 2024-01-30 DIAGNOSIS — Z86.19 HISTORY OF SYPHILIS: ICD-10-CM

## 2024-01-30 DIAGNOSIS — O99.340 MATERNAL MENTAL DISORDER, ANTEPARTUM: ICD-10-CM

## 2024-01-30 PROCEDURE — 93325 DOPPLER ECHO COLOR FLOW MAPG: CPT | Performed by: OBSTETRICS & GYNECOLOGY

## 2024-01-30 PROCEDURE — 76811 OB US DETAILED SNGL FETUS: CPT | Performed by: OBSTETRICS & GYNECOLOGY

## 2024-01-30 PROCEDURE — 76827 ECHO EXAM OF FETAL HEART: CPT | Performed by: OBSTETRICS & GYNECOLOGY

## 2024-01-30 PROCEDURE — 96127 BRIEF EMOTIONAL/BEHAV ASSMT: CPT | Performed by: OBSTETRICS & GYNECOLOGY

## 2024-01-30 PROCEDURE — 99215 OFFICE O/P EST HI 40 MIN: CPT | Performed by: OBSTETRICS & GYNECOLOGY

## 2024-01-30 PROCEDURE — 76825 ECHO EXAM OF FETAL HEART: CPT | Performed by: OBSTETRICS & GYNECOLOGY

## 2024-01-30 ASSESSMENT — PATIENT HEALTH QUESTIONNAIRE - PHQ9
2. FEELING DOWN, DEPRESSED OR HOPELESS: 0
SUM OF ALL RESPONSES TO PHQ QUESTIONS 1-9: 0
1. LITTLE INTEREST OR PLEASURE IN DOING THINGS: 0
SUM OF ALL RESPONSES TO PHQ QUESTIONS 1-9: 0
SUM OF ALL RESPONSES TO PHQ9 QUESTIONS 1 & 2: 0

## 2024-01-30 NOTE — PROGRESS NOTES
[facility] MATERNAL FETAL MEDICINE    [address] [place] [address]  P- [phone]  [phone]       MFM Follow-up Visit  Lauren Moctezuma (1990) is a 33 y.o.  at 21w1d with 6/10/2024, by Ultrasound.      Presents for evaluation of the following chief complaint(s):   Chief Complaint   Patient presents with    High Risk Pregnancy     ADD, Anxiety/Depression, BMI >30, Chronic back pain, Medication exposure, Short interval pregnancy, THC use     Patient is a SAHM.     Patient is scheduled to see Primary OB ([facility]) on 24.    No recent HA's.  Has trace BLE edema , improves with rest/elevating legs. Denies Pre-eclamptic symptoms. No bleeding or LOF.  No contractions or cramping. No vaginal pressure recently. Reports good fetal movement. Reports acid reflux, improves with Pepcid.     Patient states she is tired today. She is continuing to take multiple medications as prescribed. We reviewed again today in detail risks for  withdrawal. Recommend  consultation ~32-34wk.   Additionally, pt vapes with substance including nicotine. Reviewed this will exacerbate  withdrawal.   Due to risks of growth lag, recommend growth with OB office monthly.     Mood evaluated today based on discussion with pt and PHQ screen.       2024     9:41 AM   PHQ-9    Little interest or pleasure in doing things 0   Feeling down, depressed, or hopeless 0   PHQ-2 Score 0   PHQ-9 Total Score 0      Mood Reassuring today  As mood stable and depression/anxiety well-controlled, will not change medications today.    Interval history since prior appt reviewed and updated as indicated.     Addressed normal pregnancy complaints, reassured and offered suggestions for care  Reviewed gestational age precautions and activity goals/limitations  Nutritional counseling as well as specific goals based on current maternal and fetal status  Options for GERD, constipation, other common complaints

## 2024-01-31 NOTE — PATIENT INSTRUCTIONS
If you have pelvic or back pain and discomfort that is NOT cramping or contractions, you can consider these things to help.     Maternity support belt/K-tape (online instructional videos at www.[email or web address])  Heat/Cold (whichever soothes more)  Massage  Tylenol/biofreeze/similar  Pelvic Floor PT   Aquatic therapy   Chiropractic care  TENS unit can be considered.        Resources for Depression/Anxiety  Postpartum Support International (PSI).    PSI Warmline:  2-629-755-4PPD (8384).  WWW.[email or web address].NET    Mom's IMPACTT  [email or web address]

## 2024-02-01 ENCOUNTER — FOLLOWUP TELEPHONE ENCOUNTER (OUTPATIENT)
Dept: CASE MANAGEMENT | Age: 34
End: 2024-02-01

## 2024-02-01 NOTE — TELEPHONE ENCOUNTER
Request received from [facility] to reach out to patient.  Phone call to patient at [phone].    Introduction made as OB Care Coordinator.      Patient denies any ongoing depression/anxiety.  Per patient, \"I'm fine.\"  She confirms that she was prescribed Prozac during her last pregnancy, and her PCP (Dr. Quispe) has continued prescribing this.  Patient feels that the Prozac manages her moods well.  Patient denied the need for any counseling/mental health resources.    Patient currently receives WIC for both herself and her 10 m/o son.  She does not feel that she would qualify for SNAP/Food [place] due to her boyfriend exceeding the income criteria.      Patient denied any needs at this time and is receptive to another call in several weeks.  Additionally, patient was encouraged to reach out if any needs/questions arise in the meantime.      BRITTANIE Valadez-LAZ, PMH-C  [facility]   [phone]

## 2024-02-22 NOTE — PROGRESS NOTES
33 y.o.  at 25w1d  who is here for routine OB visit.  Pt is doing well, with no bleeding or complications.     24: next mfm appt  MFM recommend monthly growth US    Urine drug screen pending today    1-hr diabetes test / CBC and growth US next visit    US findings from today:  EFW SECONDARY TO MEDICATION EXPOSURE, VAPING, SHORT INTERVAL PREGNANCY  JXJ=977  0V=61%  AC=39%  PLACENTA ANTERIOR GRADE 1  POSITION=CEPHALIC  AMINOTIC FLUID APPEARS WNL    HISTORY:  OB History    Para Term  AB Living   3 2 2     2   SAB IAB Ectopic Molar Multiple Live Births             2      # Outcome Date GA Lbr Juan/2nd Weight Sex Delivery Anes PTL Lv   3 Current            2 Term 23 39w4d / 00:21 3.681 kg (8 lb 1.8 oz) M Vag-Spont EPI N MIKE   1 Term 14 39w0d  3.118 kg (6 lb 14 oz) F Vag-Spont   MIKE      Patient's last menstrual period was 2023 (approximate).  Social History     Substance and Sexual Activity   Sexual Activity Yes    Partners: Male    Birth control/protection: None      Past Medical History:   Diagnosis Date    Abnormal Pap smear of cervix     states abn last yr-pt unsure of what trmt she had    ADD (attention deficit disorder)     Anxiety     Depression     Sciatic pain     Trauma       Past Surgical History:   Procedure Laterality Date    ORTHOPEDIC SURGERY      3 on right leg , 1 left leg       ROS:  Feeling well. No dyspnea or chest pain on exertion.  No abdominal pain, change in bowel habits, black or bloody stools.  No urinary tract symptoms.   OB ROS: No vaginal bleeding, cxns, LOF, decreased FM. No HA, vision changes, abdominal pain.    PHYSICAL EXAM:  /60   Wt 90.6 kg (199 lb 11.2 oz)   LMP 2023 (Approximate)   BMI 32.23 kg/m²        ASSESSMENT / PLAN:  1. Obesity affecting pregnancy in second trimester, unspecified obesity type  -     AMB POC OB URINE DIP  2. 24 weeks gestation of pregnancy  -     AMB POC OB URINE DIP  -     Urine Drug Screen; Future  -

## 2024-02-27 ENCOUNTER — ROUTINE PRENATAL (OUTPATIENT)
Dept: OBGYN CLINIC | Age: 34
End: 2024-02-27
Payer: MEDICAID

## 2024-02-27 ENCOUNTER — PROCEDURE VISIT (OUTPATIENT)
Dept: OBGYN CLINIC | Age: 34
End: 2024-02-27
Payer: MEDICAID

## 2024-02-27 ENCOUNTER — FOLLOWUP TELEPHONE ENCOUNTER (OUTPATIENT)
Dept: CASE MANAGEMENT | Age: 34
End: 2024-02-27

## 2024-02-27 VITALS — WEIGHT: 199.7 LBS | DIASTOLIC BLOOD PRESSURE: 60 MMHG | SYSTOLIC BLOOD PRESSURE: 102 MMHG | BODY MASS INDEX: 32.23 KG/M2

## 2024-02-27 DIAGNOSIS — O09.92 HIGH-RISK PREGNANCY IN SECOND TRIMESTER: ICD-10-CM

## 2024-02-27 DIAGNOSIS — Z3A.24 24 WEEKS GESTATION OF PREGNANCY: ICD-10-CM

## 2024-02-27 DIAGNOSIS — O09.92 HIGH-RISK PREGNANCY IN SECOND TRIMESTER: Primary | ICD-10-CM

## 2024-02-27 DIAGNOSIS — Z13.89 SCREENING FOR GENITOURINARY CONDITION: ICD-10-CM

## 2024-02-27 DIAGNOSIS — O99.320 MARIJUANA USE DURING PREGNANCY: ICD-10-CM

## 2024-02-27 DIAGNOSIS — O09.892 SHORT INTERVAL BETWEEN PREGNANCIES AFFECTING PREGNANCY IN SECOND TRIMESTER, ANTEPARTUM: ICD-10-CM

## 2024-02-27 DIAGNOSIS — F12.90 MARIJUANA USE DURING PREGNANCY: ICD-10-CM

## 2024-02-27 DIAGNOSIS — O99.212 OBESITY AFFECTING PREGNANCY IN SECOND TRIMESTER, UNSPECIFIED OBESITY TYPE: Primary | ICD-10-CM

## 2024-02-27 DIAGNOSIS — O09.891 MEDICATION EXPOSURE DURING FIRST TRIMESTER OF PREGNANCY: ICD-10-CM

## 2024-02-27 DIAGNOSIS — Z3A.25 25 WEEKS GESTATION OF PREGNANCY: ICD-10-CM

## 2024-02-27 DIAGNOSIS — O99.331: ICD-10-CM

## 2024-02-27 LAB
AMPHET UR QL SCN: NEGATIVE
BARBITURATES UR QL SCN: NEGATIVE
BENZODIAZ UR QL: POSITIVE
CANNABINOIDS UR QL SCN: POSITIVE
COCAINE UR QL SCN: NEGATIVE
GLUCOSE URINE, POC: NEGATIVE
METHADONE UR QL: NEGATIVE
OPIATES UR QL: POSITIVE
PCP UR QL: NEGATIVE
PROTEIN,URINE, POC: NORMAL

## 2024-02-27 PROCEDURE — 99213 OFFICE O/P EST LOW 20 MIN: CPT | Performed by: STUDENT IN AN ORGANIZED HEALTH CARE EDUCATION/TRAINING PROGRAM

## 2024-02-27 PROCEDURE — 81002 URINALYSIS NONAUTO W/O SCOPE: CPT | Performed by: STUDENT IN AN ORGANIZED HEALTH CARE EDUCATION/TRAINING PROGRAM

## 2024-02-27 PROCEDURE — 76816 OB US FOLLOW-UP PER FETUS: CPT | Performed by: STUDENT IN AN ORGANIZED HEALTH CARE EDUCATION/TRAINING PROGRAM

## 2024-02-28 NOTE — TELEPHONE ENCOUNTER
Phone call to patient at [phone] to check-in.    Patient states that overall, she's been doing well since our last conversation.  She shared that her vehicle was recently repaired, so she has no concerns about transportation to/from doctor appointments.    Patient denied any needs at this time and is receptive to another call in 1 month.  SW also encouraged patient to reach out if any needs/questions arise.    BRITTANIE aVladez-LAZ, [facility]-C  [facility]   [phone]

## 2024-03-05 LAB — CANNABINOIDS UR QL CFM: NEGATIVE

## 2024-03-20 ENCOUNTER — PROCEDURE VISIT (OUTPATIENT)
Dept: OBGYN CLINIC | Age: 34
End: 2024-03-20
Payer: MEDICAID

## 2024-03-20 ENCOUNTER — ROUTINE PRENATAL (OUTPATIENT)
Dept: OBGYN CLINIC | Age: 34
End: 2024-03-20
Payer: MEDICAID

## 2024-03-20 VITALS — WEIGHT: 197 LBS | BODY MASS INDEX: 31.8 KG/M2 | SYSTOLIC BLOOD PRESSURE: 116 MMHG | DIASTOLIC BLOOD PRESSURE: 64 MMHG

## 2024-03-20 DIAGNOSIS — Z11.3 SCREENING FOR VENEREAL DISEASE (VD): ICD-10-CM

## 2024-03-20 DIAGNOSIS — Z13.0 SCREENING, ANEMIA, DEFICIENCY, IRON: ICD-10-CM

## 2024-03-20 DIAGNOSIS — O09.891 MEDICATION EXPOSURE DURING FIRST TRIMESTER OF PREGNANCY: Primary | ICD-10-CM

## 2024-03-20 DIAGNOSIS — Z34.83 PRENATAL CARE, SUBSEQUENT PREGNANCY, THIRD TRIMESTER: Primary | ICD-10-CM

## 2024-03-20 DIAGNOSIS — O99.212 OBESITY AFFECTING PREGNANCY IN SECOND TRIMESTER, UNSPECIFIED OBESITY TYPE: ICD-10-CM

## 2024-03-20 DIAGNOSIS — Z13.1 SCREENING FOR DIABETES MELLITUS: ICD-10-CM

## 2024-03-20 DIAGNOSIS — O99.331: ICD-10-CM

## 2024-03-20 DIAGNOSIS — Z3A.28 28 WEEKS GESTATION OF PREGNANCY: ICD-10-CM

## 2024-03-20 DIAGNOSIS — Z13.89 SCREENING FOR GENITOURINARY CONDITION: ICD-10-CM

## 2024-03-20 DIAGNOSIS — O09.892 SHORT INTERVAL BETWEEN PREGNANCIES AFFECTING PREGNANCY IN SECOND TRIMESTER, ANTEPARTUM: ICD-10-CM

## 2024-03-20 LAB
ERYTHROCYTE [DISTWIDTH] IN BLOOD BY AUTOMATED COUNT: 13.2 % (ref 11.9–14.6)
GLUCOSE 1 HOUR: 91 MG/DL
GLUCOSE URINE, POC: NEGATIVE
HCT VFR BLD AUTO: 34.2 % (ref 35.8–46.3)
HGB BLD-MCNC: 11 G/DL (ref 11.7–15.4)
MCH RBC QN AUTO: 30.5 PG (ref 26.1–32.9)
MCHC RBC AUTO-ENTMCNC: 32.2 G/DL (ref 31.4–35)
MCV RBC AUTO: 94.7 FL (ref 82–102)
NRBC # BLD: 0 K/UL (ref 0–0.2)
PLATELET # BLD AUTO: 367 K/UL (ref 150–450)
PMV BLD AUTO: 10.3 FL (ref 9.4–12.3)
PROTEIN,URINE, POC: NEGATIVE
RBC # BLD AUTO: 3.61 M/UL (ref 4.05–5.2)
WBC # BLD AUTO: 9.5 K/UL (ref 4.3–11.1)

## 2024-03-20 PROCEDURE — 99214 OFFICE O/P EST MOD 30 MIN: CPT | Performed by: OBSTETRICS & GYNECOLOGY

## 2024-03-20 PROCEDURE — 76816 OB US FOLLOW-UP PER FETUS: CPT | Performed by: OBSTETRICS & GYNECOLOGY

## 2024-03-20 PROCEDURE — 81002 URINALYSIS NONAUTO W/O SCOPE: CPT | Performed by: OBSTETRICS & GYNECOLOGY

## 2024-03-20 RX ORDER — FLUCONAZOLE 100 MG/1
100 TABLET ORAL DAILY
Qty: 2 TABLET | Refills: 0 | Status: SHIPPED | OUTPATIENT
Start: 2024-03-20 | End: 2024-03-24

## 2024-03-20 NOTE — PROGRESS NOTES
Glucoal, CBC today  Sees MFM next month for substance abuse reviewed note  Anatomy complete but PAULA has decreased to 7.2  EFW 2-15, measures 1 weeks ahead BPP 8/8  Will repeat in 2 weeks advised more water  Had strep throat 2 weeks ago, took po antiBx now with yeast; sent diflucan Rx

## 2024-03-21 ENCOUNTER — TELEPHONE (OUTPATIENT)
Dept: OBGYN CLINIC | Age: 34
End: 2024-03-21

## 2024-03-21 DIAGNOSIS — O09.92 HIGH-RISK PREGNANCY IN SECOND TRIMESTER: Primary | ICD-10-CM

## 2024-03-21 LAB — RPR SER QL: NONREACTIVE

## 2024-03-21 NOTE — TELEPHONE ENCOUNTER
----- Message from Federico Salazar MD sent at 3/21/2024  8:27 AM EDT -----  Regarding: glu hgb  Passed glucola  Hgb low otf 11  Needs Slow Fe every other day  ----- Message -----  From: Bart Franklin Incoming [place] [facility]  Sent: 3/20/2024   4:38 PM EDT  To: Federico Salazar MD

## 2024-03-27 ENCOUNTER — FOLLOWUP TELEPHONE ENCOUNTER (OUTPATIENT)
Dept: CASE MANAGEMENT | Age: 34
End: 2024-03-27

## 2024-03-27 NOTE — TELEPHONE ENCOUNTER
Phone call to patient at [phone] to check-in.    Patient feels like her current psychiatric medications are working well.  She is taking Prozac daily and Klonopin (2-3x/day).      Overall, patient states that she's doing well without any needs at this time.    Patient agreeable for  to call back and check-in.  Additionally, patient was encouraged to reach out if any needs/questions arise in the meantime.      BRITTANIE Valadez-LAZ, PMH-C  [facility]   [phone]

## 2024-04-02 ENCOUNTER — ROUTINE PRENATAL (OUTPATIENT)
Dept: OBGYN CLINIC | Age: 34
End: 2024-04-02
Payer: MEDICAID

## 2024-04-02 ENCOUNTER — PROCEDURE VISIT (OUTPATIENT)
Dept: OBGYN CLINIC | Age: 34
End: 2024-04-02
Payer: MEDICAID

## 2024-04-02 VITALS — SYSTOLIC BLOOD PRESSURE: 136 MMHG | BODY MASS INDEX: 33.09 KG/M2 | WEIGHT: 205 LBS | DIASTOLIC BLOOD PRESSURE: 62 MMHG

## 2024-04-02 DIAGNOSIS — O09.93 HIGH-RISK PREGNANCY IN THIRD TRIMESTER: Primary | ICD-10-CM

## 2024-04-02 DIAGNOSIS — O09.892 SHORT INTERVAL BETWEEN PREGNANCIES AFFECTING PREGNANCY IN SECOND TRIMESTER, ANTEPARTUM: ICD-10-CM

## 2024-04-02 DIAGNOSIS — Z3A.30 30 WEEKS GESTATION OF PREGNANCY: ICD-10-CM

## 2024-04-02 DIAGNOSIS — F11.20 POLYSUBSTANCE DEPENDENCE INCLUDING OPIOID TYPE DRUG, CONTINUOUS USE (HCC): ICD-10-CM

## 2024-04-02 DIAGNOSIS — O09.891 MEDICATION EXPOSURE DURING FIRST TRIMESTER OF PREGNANCY: ICD-10-CM

## 2024-04-02 DIAGNOSIS — O28.8 AFI (AMNIOTIC FLUID INDEX) BORDERLINE LOW: Primary | ICD-10-CM

## 2024-04-02 DIAGNOSIS — O99.212 OBESITY AFFECTING PREGNANCY IN SECOND TRIMESTER, UNSPECIFIED OBESITY TYPE: ICD-10-CM

## 2024-04-02 DIAGNOSIS — O99.331: ICD-10-CM

## 2024-04-02 DIAGNOSIS — F19.20 POLYSUBSTANCE DEPENDENCE INCLUDING OPIOID TYPE DRUG, CONTINUOUS USE (HCC): ICD-10-CM

## 2024-04-02 DIAGNOSIS — Z13.89 SCREENING FOR GENITOURINARY CONDITION: ICD-10-CM

## 2024-04-02 LAB
GLUCOSE URINE, POC: NEGATIVE
PROTEIN,URINE, POC: NORMAL

## 2024-04-02 PROCEDURE — 81002 URINALYSIS NONAUTO W/O SCOPE: CPT | Performed by: OBSTETRICS & GYNECOLOGY

## 2024-04-02 PROCEDURE — 76819 FETAL BIOPHYS PROFIL W/O NST: CPT | Performed by: OBSTETRICS & GYNECOLOGY

## 2024-04-02 PROCEDURE — 99213 OFFICE O/P EST LOW 20 MIN: CPT | Performed by: OBSTETRICS & GYNECOLOGY
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
## 2024-04-12 ENCOUNTER — PROCEDURE VISIT (OUTPATIENT)
Dept: OBGYN CLINIC | Age: 34
End: 2024-04-12
Payer: MEDICAID

## 2024-04-12 ENCOUNTER — ROUTINE PRENATAL (OUTPATIENT)
Dept: OBGYN CLINIC | Age: 34
End: 2024-04-12

## 2024-04-12 VITALS — BODY MASS INDEX: 32.99 KG/M2 | WEIGHT: 204.4 LBS | DIASTOLIC BLOOD PRESSURE: 76 MMHG | SYSTOLIC BLOOD PRESSURE: 130 MMHG

## 2024-04-12 DIAGNOSIS — O09.891 MEDICATION EXPOSURE DURING FIRST TRIMESTER OF PREGNANCY: ICD-10-CM

## 2024-04-12 DIAGNOSIS — O09.892 SHORT INTERVAL BETWEEN PREGNANCIES AFFECTING PREGNANCY IN SECOND TRIMESTER, ANTEPARTUM: ICD-10-CM

## 2024-04-12 DIAGNOSIS — O09.93 HIGH-RISK PREGNANCY IN THIRD TRIMESTER: ICD-10-CM

## 2024-04-12 DIAGNOSIS — Z3A.31 31 WEEKS GESTATION OF PREGNANCY: ICD-10-CM

## 2024-04-12 DIAGNOSIS — Z13.89 SCREENING FOR GENITOURINARY CONDITION: ICD-10-CM

## 2024-04-12 DIAGNOSIS — O09.92 HIGH-RISK PREGNANCY IN SECOND TRIMESTER: Primary | ICD-10-CM

## 2024-04-12 DIAGNOSIS — F11.20 POLYSUBSTANCE DEPENDENCE INCLUDING OPIOID TYPE DRUG, CONTINUOUS USE (HCC): Primary | ICD-10-CM

## 2024-04-12 DIAGNOSIS — F19.20 POLYSUBSTANCE DEPENDENCE INCLUDING OPIOID TYPE DRUG, CONTINUOUS USE (HCC): Primary | ICD-10-CM

## 2024-04-12 LAB
GLUCOSE URINE, POC: NEGATIVE
PROTEIN,URINE, POC: NORMAL

## 2024-04-12 PROCEDURE — 76819 FETAL BIOPHYS PROFIL W/O NST: CPT | Performed by: OBSTETRICS & GYNECOLOGY

## 2024-04-12 NOTE — PROGRESS NOTES
Patient Active Problem List    Diagnosis Date Noted    Polysubstance dependence including opioid type drug, continuous use (HCC) 2023     Priority: High     23 [facility]: Currently taking: hydrocodone 5/325 (90 Rx monthly by PCP), Klonopin 0.5mg (90 Rx by PCP monthly), adderall 15mg XR BID (60 tab Rx by PCP monthly), gabapentin 600mg TID ( 90tab Rx by PCP monthly), fluoxetine 10-30mg daily (Rx prescribed by multiple PCPs monthly). Previously on tramadol 50mg (120Rx by PCP monthly until 2023).     HIGH risk for  withdrawal with polysubstance use exacerbated by nicotine use from vaping.   Counseled today to minimize use, change to less impactful medications. Nonpharmacologic options for mood and pain reviewed orally and in writing.     Last UDS negative for all substances in November.     24 [facility]: UDS on 24 positive for Benzo, Amphetamine, Opiates and THC. Has appointment mid February with , PCP who manages above medications.   Recommend lowest doses possible; risks of vaping in pregnancy reviewed.   Probability of  withdrawal again reviewed. Plan Shaun consult ~34 weeks.       Short interval between pregnancies affecting pregnancy in second trimester, antepartum 2023     Priority: Medium     - 3/2023  TA Cervical eval reassuring     24 [facility]: CL-3.0 cm. Denies vaginal pressure.       Marijuana use during pregnancy 2024 UDS negative.   2024 UDS positive. Titer=61 Made aware of policy(my chart)  2024 repeat UDS- neg    24 [facility]: + UDS for THC. Reports using Delta 8 products not THC. Pt encouraged to d/c use. Referred to [facility]. NEEDS CORD SEGMENT AT DELIVERY.      Obesity affecting pregnancy in second trimester 2023    False positive serological test for hepatitis C 2023     Confirmatory testing was negative 23.      Medication exposure during first trimester of pregnancy 2023

## 2024-04-16 ENCOUNTER — ROUTINE PRENATAL (OUTPATIENT)
Dept: OBGYN CLINIC | Age: 34
End: 2024-04-16
Payer: MEDICAID

## 2024-04-16 ENCOUNTER — CLINICAL DOCUMENTATION (OUTPATIENT)
Dept: OBGYN CLINIC | Age: 34
End: 2024-04-16

## 2024-04-16 ENCOUNTER — OFFICE VISIT (OUTPATIENT)
Dept: OBGYN CLINIC | Age: 34
End: 2024-04-16

## 2024-04-16 VITALS — SYSTOLIC BLOOD PRESSURE: 113 MMHG | DIASTOLIC BLOOD PRESSURE: 73 MMHG | HEART RATE: 91 BPM

## 2024-04-16 DIAGNOSIS — O99.331: ICD-10-CM

## 2024-04-16 DIAGNOSIS — M54.9 BACK PAIN COMPLICATING PREGNANCY IN THIRD TRIMESTER: ICD-10-CM

## 2024-04-16 DIAGNOSIS — O09.93 HIGH-RISK PREGNANCY IN THIRD TRIMESTER: Primary | ICD-10-CM

## 2024-04-16 DIAGNOSIS — O09.891 MEDICATION EXPOSURE DURING FIRST TRIMESTER OF PREGNANCY: ICD-10-CM

## 2024-04-16 DIAGNOSIS — O09.92 HIGH-RISK PREGNANCY IN SECOND TRIMESTER: Primary | ICD-10-CM

## 2024-04-16 DIAGNOSIS — O99.340 MATERNAL MENTAL DISORDER, ANTEPARTUM: ICD-10-CM

## 2024-04-16 DIAGNOSIS — F19.11 HISTORY OF DRUG ABUSE (HCC): ICD-10-CM

## 2024-04-16 DIAGNOSIS — F11.20 POLYSUBSTANCE DEPENDENCE INCLUDING OPIOID TYPE DRUG, CONTINUOUS USE (HCC): ICD-10-CM

## 2024-04-16 DIAGNOSIS — O99.320 MARIJUANA USE DURING PREGNANCY: ICD-10-CM

## 2024-04-16 DIAGNOSIS — O99.213 OBESITY AFFECTING PREGNANCY IN THIRD TRIMESTER, UNSPECIFIED OBESITY TYPE: ICD-10-CM

## 2024-04-16 DIAGNOSIS — F19.20 POLYSUBSTANCE DEPENDENCE INCLUDING OPIOID TYPE DRUG, CONTINUOUS USE (HCC): ICD-10-CM

## 2024-04-16 DIAGNOSIS — Z3A.32 32 WEEKS GESTATION OF PREGNANCY: ICD-10-CM

## 2024-04-16 DIAGNOSIS — O99.013 ANEMIA DURING PREGNANCY IN THIRD TRIMESTER: ICD-10-CM

## 2024-04-16 DIAGNOSIS — F98.8 ATTENTION DEFICIT DISORDER, UNSPECIFIED HYPERACTIVITY PRESENCE: ICD-10-CM

## 2024-04-16 DIAGNOSIS — F12.90 MARIJUANA USE DURING PREGNANCY: ICD-10-CM

## 2024-04-16 DIAGNOSIS — O09.893 SHORT INTERVAL BETWEEN PREGNANCIES AFFECTING PREGNANCY IN THIRD TRIMESTER, ANTEPARTUM: ICD-10-CM

## 2024-04-16 DIAGNOSIS — M25.551 PAIN OF RIGHT HIP JOINT: ICD-10-CM

## 2024-04-16 DIAGNOSIS — F39 MOOD DISORDER (HCC): ICD-10-CM

## 2024-04-16 DIAGNOSIS — O99.891 BACK PAIN COMPLICATING PREGNANCY IN THIRD TRIMESTER: ICD-10-CM

## 2024-04-16 PROCEDURE — 99214 OFFICE O/P EST MOD 30 MIN: CPT | Performed by: OBSTETRICS & GYNECOLOGY

## 2024-04-16 PROCEDURE — 76826 ECHO EXAM OF FETAL HEART: CPT | Performed by: OBSTETRICS & GYNECOLOGY

## 2024-04-16 PROCEDURE — 76819 FETAL BIOPHYS PROFIL W/O NST: CPT | Performed by: OBSTETRICS & GYNECOLOGY

## 2024-04-16 PROCEDURE — 76816 OB US FOLLOW-UP PER FETUS: CPT | Performed by: OBSTETRICS & GYNECOLOGY

## 2024-04-16 PROCEDURE — 76828 ECHO EXAM OF FETAL HEART: CPT | Performed by: OBSTETRICS & GYNECOLOGY

## 2024-04-16 PROCEDURE — 93325 DOPPLER ECHO COLOR FLOW MAPG: CPT | Performed by: OBSTETRICS & GYNECOLOGY

## 2024-04-16 RX ORDER — OXYCODONE HYDROCHLORIDE 5 MG/1
5 CAPSULE ORAL EVERY 4 HOURS PRN
COMMUNITY
End: 2024-05-14

## 2024-04-16 ASSESSMENT — PATIENT HEALTH QUESTIONNAIRE - PHQ9
SUM OF ALL RESPONSES TO PHQ QUESTIONS 1-9: 0
2. FEELING DOWN, DEPRESSED OR HOPELESS: NOT AT ALL
SUM OF ALL RESPONSES TO PHQ9 QUESTIONS 1 & 2: 0
SUM OF ALL RESPONSES TO PHQ QUESTIONS 1-9: 0
1. LITTLE INTEREST OR PLEASURE IN DOING THINGS: NOT AT ALL

## 2024-04-16 NOTE — PROGRESS NOTES
Consult scheduled with Dr Ruffin on 24 at 1100 for medication exposure.  Scheduled with Cameron. Dr Ruffin aware.
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
## 2024-04-16 NOTE — PATIENT INSTRUCTIONS
Resources for Depression/Anxiety  Postpartum Support International (PSI).    PSI Warmline:  8-621-436-4PPD (6646).  WWW.[email or web address].NET    Mom's IMPACTT  [email or web address]       In order to optimize maternal, fetal, and  health, we recommend the following vaccinations.   Flu- yearly (https://www.highriskpregnancyinfo.org/flu-facts-for-pregnancy)  Consider Covid vaccination/booster (https://www.highriskpregnancyinfo.org/covid-19-pregnancy)  TDaP after 28 weeks each pregnancy (https://www.highriskpregnancyinfo.org/tdap)  Consider RSV vaccine 32-36 weeks of pregnancy, if Sept- January.  (https://www.highriskpregnancyinfo.org/rsv)

## 2024-04-25 ENCOUNTER — PROCEDURE VISIT (OUTPATIENT)
Dept: OBGYN CLINIC | Age: 34
End: 2024-04-25
Payer: MEDICAID

## 2024-04-25 ENCOUNTER — ROUTINE PRENATAL (OUTPATIENT)
Dept: OBGYN CLINIC | Age: 34
End: 2024-04-25
Payer: MEDICAID

## 2024-04-25 VITALS — WEIGHT: 207.2 LBS | DIASTOLIC BLOOD PRESSURE: 76 MMHG | BODY MASS INDEX: 33.44 KG/M2 | SYSTOLIC BLOOD PRESSURE: 112 MMHG

## 2024-04-25 DIAGNOSIS — Z13.89 SCREENING FOR GENITOURINARY CONDITION: ICD-10-CM

## 2024-04-25 DIAGNOSIS — F19.20 POLYSUBSTANCE DEPENDENCE INCLUDING OPIOID TYPE DRUG, CONTINUOUS USE (HCC): Primary | ICD-10-CM

## 2024-04-25 DIAGNOSIS — Z34.83 PRENATAL CARE, SUBSEQUENT PREGNANCY, THIRD TRIMESTER: Primary | ICD-10-CM

## 2024-04-25 DIAGNOSIS — O09.893 SHORT INTERVAL BETWEEN PREGNANCIES AFFECTING PREGNANCY IN THIRD TRIMESTER, ANTEPARTUM: ICD-10-CM

## 2024-04-25 DIAGNOSIS — O09.93 HIGH-RISK PREGNANCY IN THIRD TRIMESTER: ICD-10-CM

## 2024-04-25 DIAGNOSIS — Z3A.33 33 WEEKS GESTATION OF PREGNANCY: ICD-10-CM

## 2024-04-25 DIAGNOSIS — F11.20 POLYSUBSTANCE DEPENDENCE INCLUDING OPIOID TYPE DRUG, CONTINUOUS USE (HCC): Primary | ICD-10-CM

## 2024-04-25 LAB
GLUCOSE URINE, POC: NEGATIVE
PROTEIN,URINE, POC: NEGATIVE

## 2024-04-25 PROCEDURE — 76819 FETAL BIOPHYS PROFIL W/O NST: CPT | Performed by: OBSTETRICS & GYNECOLOGY

## 2024-04-25 PROCEDURE — 99213 OFFICE O/P EST LOW 20 MIN: CPT | Performed by: OBSTETRICS & GYNECOLOGY

## 2024-04-25 PROCEDURE — 81002 URINALYSIS NONAUTO W/O SCOPE: CPT | Performed by: OBSTETRICS & GYNECOLOGY

## 2024-04-25 NOTE — PROGRESS NOTES
Patient Active Problem List    Diagnosis Date Noted    Polysubstance dependence including opioid type drug, continuous use (HCC) 2023     Priority: High     23 [facility]: Currently taking: hydrocodone 5/325 (90 Rx monthly by PCP), Klonopin 0.5mg (90 Rx by PCP monthly), adderall 15mg XR BID (60 tab Rx by PCP monthly), gabapentin 600mg TID ( 90tab Rx by PCP monthly), fluoxetine 10-30mg daily (Rx prescribed by multiple PCPs monthly). Previously on tramadol 50mg (120Rx by PCP monthly until 2023).     HIGH risk for  withdrawal with polysubstance use exacerbated by nicotine use from vaping.   Counseled today to minimize use, change to less impactful medications. Nonpharmacologic options for mood and pain reviewed orally and in writing.     Last UDS negative for all substances in November.     24 [facility]: UDS on 24 positive for Benzo, Amphetamine, Opiates and THC. Has appointment mid February with , PCP who manages above medications.   Recommend lowest doses possible; risks of vaping in pregnancy reviewed.   Probability of  withdrawal again reviewed. Plan Shaun consult ~34 weeks.     24 [facility]: Reports currently taking Klonopin, Adderall, Oxycodone, Gabapentin and Prozac daily. Scheduled Shaun Consult with  for 24 at 1100.         Short interval between pregnancies affecting pregnancy in third trimester, antepartum 2023     Priority: Medium     - 3/2023  TA Cervical eval reassuring     24 [facility]: CL-3.0 cm. Denies vaginal pressure.       Anemia during pregnancy in third trimester 2024     3/20/24: Hgb- 11.0.    24 [facility]: Reports taking iron supplement daily.         Marijuana use during pregnancy 2024 UDS negative.   2024 UDS positive. Titer=61 Made aware of policy(my chart)  2024 repeat UDS- neg    24 [facility]: + UDS for THC. Reports using Delta 8 products not THC. Pt encouraged to d/c use. Referred to

## 2024-04-26 ENCOUNTER — FOLLOWUP TELEPHONE ENCOUNTER (OUTPATIENT)
Dept: CASE MANAGEMENT | Age: 34
End: 2024-04-26

## 2024-04-26 ENCOUNTER — ROUTINE PRENATAL (OUTPATIENT)
Dept: OBGYN CLINIC | Age: 34
End: 2024-04-26
Payer: MEDICAID

## 2024-04-26 ENCOUNTER — PROCEDURE VISIT (OUTPATIENT)
Dept: OBGYN CLINIC | Age: 34
End: 2024-04-26
Payer: MEDICAID

## 2024-04-26 VITALS — WEIGHT: 206.4 LBS | DIASTOLIC BLOOD PRESSURE: 72 MMHG | SYSTOLIC BLOOD PRESSURE: 110 MMHG | BODY MASS INDEX: 33.31 KG/M2

## 2024-04-26 DIAGNOSIS — Z3A.33 33 WEEKS GESTATION OF PREGNANCY: ICD-10-CM

## 2024-04-26 DIAGNOSIS — O28.8 AFI (AMNIOTIC FLUID INDEX) BORDERLINE LOW: Primary | ICD-10-CM

## 2024-04-26 DIAGNOSIS — O09.93 HIGH-RISK PREGNANCY IN THIRD TRIMESTER: Primary | ICD-10-CM

## 2024-04-26 DIAGNOSIS — Z13.89 SCREENING FOR GENITOURINARY CONDITION: ICD-10-CM

## 2024-04-26 LAB
GLUCOSE URINE, POC: NEGATIVE
PROTEIN,URINE, POC: NEGATIVE

## 2024-04-26 PROCEDURE — 81002 URINALYSIS NONAUTO W/O SCOPE: CPT | Performed by: OBSTETRICS & GYNECOLOGY

## 2024-04-26 PROCEDURE — 76819 FETAL BIOPHYS PROFIL W/O NST: CPT | Performed by: OBSTETRICS & GYNECOLOGY

## 2024-04-26 PROCEDURE — 99212 OFFICE O/P EST SF 10 MIN: CPT | Performed by: OBSTETRICS & GYNECOLOGY

## 2024-04-26 NOTE — PROGRESS NOTES
Patient continues a good fetal movement.  Ultrasound today shows PAULA of 10.8.  BPP 8/8.  Discussed with patient this is a normal level and this is very reassuring.  Continue having her hydrate.  She is scheduled for her NICU consult on Tuesday we will see her back Thursday or Friday with a repeat BPP to follow fluid.  Given her strict fetal movement precautions that if she notices any change to report to the hospital or let us know.

## 2024-04-26 NOTE — TELEPHONE ENCOUNTER
Phone call to patient at [phone] to check-in.    Overall, patient states that she's doing well at this time.  She is taking Prozac daily and Klonopin, Oxycodone, and Adderall.  Per patient, she was switched from Hydrocodone to Oxycodone for increased pain management and to \"not take all the Tylenol.\"  She reports that the Oxycodone is working better than the Hydrocodone.    Patient is meeting with our Neonatologist on Tuesday, 4/30/24 for NICU consultation.      Patient denied any needs at this time.  Patient agreeable for  to call back and check-in.  SW encouraged patient to reach out if any needs/questions arise in the meantime.      BRITTANIE Valadez-LAZ, PMH-C  [facility]   [phone]

## 2024-05-03 ENCOUNTER — PROCEDURE VISIT (OUTPATIENT)
Dept: OBGYN CLINIC | Age: 34
End: 2024-05-03
Payer: MEDICAID

## 2024-05-03 ENCOUNTER — ROUTINE PRENATAL (OUTPATIENT)
Dept: OBGYN CLINIC | Age: 34
End: 2024-05-03
Payer: MEDICAID

## 2024-05-03 VITALS — WEIGHT: 203.2 LBS | DIASTOLIC BLOOD PRESSURE: 64 MMHG | SYSTOLIC BLOOD PRESSURE: 108 MMHG | BODY MASS INDEX: 32.8 KG/M2

## 2024-05-03 DIAGNOSIS — Z3A.34 34 WEEKS GESTATION OF PREGNANCY: ICD-10-CM

## 2024-05-03 DIAGNOSIS — Z34.83 PRENATAL CARE, SUBSEQUENT PREGNANCY, THIRD TRIMESTER: Primary | ICD-10-CM

## 2024-05-03 DIAGNOSIS — Z13.89 SCREENING FOR GENITOURINARY CONDITION: ICD-10-CM

## 2024-05-03 DIAGNOSIS — O28.8 AFI (AMNIOTIC FLUID INDEX) BORDERLINE LOW: Primary | ICD-10-CM

## 2024-05-03 DIAGNOSIS — F11.20 POLYSUBSTANCE DEPENDENCE INCLUDING OPIOID TYPE DRUG, CONTINUOUS USE (HCC): ICD-10-CM

## 2024-05-03 DIAGNOSIS — O09.893 SHORT INTERVAL BETWEEN PREGNANCIES AFFECTING PREGNANCY IN THIRD TRIMESTER, ANTEPARTUM: ICD-10-CM

## 2024-05-03 DIAGNOSIS — O09.891 MEDICATION EXPOSURE DURING FIRST TRIMESTER OF PREGNANCY: ICD-10-CM

## 2024-05-03 DIAGNOSIS — F19.20 POLYSUBSTANCE DEPENDENCE INCLUDING OPIOID TYPE DRUG, CONTINUOUS USE (HCC): ICD-10-CM

## 2024-05-03 LAB
GLUCOSE URINE, POC: NEGATIVE
PROTEIN,URINE, POC: NEGATIVE

## 2024-05-03 PROCEDURE — 81002 URINALYSIS NONAUTO W/O SCOPE: CPT | Performed by: OBSTETRICS & GYNECOLOGY

## 2024-05-03 PROCEDURE — 76819 FETAL BIOPHYS PROFIL W/O NST: CPT | Performed by: OBSTETRICS & GYNECOLOGY

## 2024-05-03 PROCEDURE — 99214 OFFICE O/P EST MOD 30 MIN: CPT | Performed by: OBSTETRICS & GYNECOLOGY

## 2024-05-03 NOTE — PROGRESS NOTES
Discussed anemia, taking Fe pills and additional dietary Fe  She is seeing MFM ( reviewed notes) in 1 1/2 weeks for multiple medications, reviewed those. Will get BPP early next week. Cont PNVs.

## 2024-05-10 ENCOUNTER — ROUTINE PRENATAL (OUTPATIENT)
Dept: OBGYN CLINIC | Age: 34
End: 2024-05-10

## 2024-05-10 ENCOUNTER — PROCEDURE VISIT (OUTPATIENT)
Dept: OBGYN CLINIC | Age: 34
End: 2024-05-10
Payer: MEDICAID

## 2024-05-10 VITALS — BODY MASS INDEX: 32.77 KG/M2 | WEIGHT: 203 LBS | DIASTOLIC BLOOD PRESSURE: 64 MMHG | SYSTOLIC BLOOD PRESSURE: 110 MMHG

## 2024-05-10 DIAGNOSIS — O99.891 BACK PAIN COMPLICATING PREGNANCY IN THIRD TRIMESTER: ICD-10-CM

## 2024-05-10 DIAGNOSIS — F19.20 POLYSUBSTANCE DEPENDENCE INCLUDING OPIOID TYPE DRUG, CONTINUOUS USE (HCC): ICD-10-CM

## 2024-05-10 DIAGNOSIS — O09.93 HIGH-RISK PREGNANCY IN THIRD TRIMESTER: ICD-10-CM

## 2024-05-10 DIAGNOSIS — Z3A.35 35 WEEKS GESTATION OF PREGNANCY: ICD-10-CM

## 2024-05-10 DIAGNOSIS — O09.893 SHORT INTERVAL BETWEEN PREGNANCIES AFFECTING PREGNANCY IN THIRD TRIMESTER, ANTEPARTUM: ICD-10-CM

## 2024-05-10 DIAGNOSIS — F11.20 POLYSUBSTANCE DEPENDENCE INCLUDING OPIOID TYPE DRUG, CONTINUOUS USE (HCC): ICD-10-CM

## 2024-05-10 DIAGNOSIS — O09.891 MEDICATION EXPOSURE DURING FIRST TRIMESTER OF PREGNANCY: ICD-10-CM

## 2024-05-10 DIAGNOSIS — R76.8 FALSE POSITIVE SEROLOGICAL TEST FOR HEPATITIS C: ICD-10-CM

## 2024-05-10 DIAGNOSIS — O99.340 MATERNAL MENTAL DISORDER, ANTEPARTUM: ICD-10-CM

## 2024-05-10 DIAGNOSIS — Z86.19 HISTORY OF SYPHILIS: ICD-10-CM

## 2024-05-10 DIAGNOSIS — F12.90 MARIJUANA USE DURING PREGNANCY: ICD-10-CM

## 2024-05-10 DIAGNOSIS — F19.11 HISTORY OF DRUG ABUSE (HCC): ICD-10-CM

## 2024-05-10 DIAGNOSIS — O99.320 MARIJUANA USE DURING PREGNANCY: ICD-10-CM

## 2024-05-10 DIAGNOSIS — M25.551 PAIN OF RIGHT HIP JOINT: ICD-10-CM

## 2024-05-10 DIAGNOSIS — Z34.83 PRENATAL CARE, SUBSEQUENT PREGNANCY, THIRD TRIMESTER: Primary | ICD-10-CM

## 2024-05-10 DIAGNOSIS — Z13.89 SCREENING FOR GENITOURINARY CONDITION: ICD-10-CM

## 2024-05-10 DIAGNOSIS — F39 MOOD DISORDER (HCC): ICD-10-CM

## 2024-05-10 DIAGNOSIS — O09.891 MEDICATION EXPOSURE DURING FIRST TRIMESTER OF PREGNANCY: Primary | ICD-10-CM

## 2024-05-10 DIAGNOSIS — O99.013 ANEMIA DURING PREGNANCY IN THIRD TRIMESTER: ICD-10-CM

## 2024-05-10 DIAGNOSIS — M54.9 BACK PAIN COMPLICATING PREGNANCY IN THIRD TRIMESTER: ICD-10-CM

## 2024-05-10 LAB
GLUCOSE URINE, POC: NEGATIVE
PROTEIN,URINE, POC: NEGATIVE

## 2024-05-10 PROCEDURE — 76819 FETAL BIOPHYS PROFIL W/O NST: CPT | Performed by: OBSTETRICS & GYNECOLOGY

## 2024-05-10 NOTE — PROGRESS NOTES
BPP 8/8 PAULA 10.4 stable  Needs GBS next  Substance abuse - THC and multiple meds- Sees MFM on Tuesday  Anemia- Taking Fe pills ok ( no constipation), cont dietary supplementation.   Anxiety/depression- stable on prozac/klonopin

## 2024-05-14 ENCOUNTER — ROUTINE PRENATAL (OUTPATIENT)
Dept: OBGYN CLINIC | Age: 34
End: 2024-05-14
Payer: MEDICAID

## 2024-05-14 VITALS — SYSTOLIC BLOOD PRESSURE: 118 MMHG | DIASTOLIC BLOOD PRESSURE: 72 MMHG

## 2024-05-14 DIAGNOSIS — O99.213 OBESITY AFFECTING PREGNANCY IN THIRD TRIMESTER, UNSPECIFIED OBESITY TYPE: ICD-10-CM

## 2024-05-14 DIAGNOSIS — O09.93 HIGH-RISK PREGNANCY IN THIRD TRIMESTER: Primary | ICD-10-CM

## 2024-05-14 DIAGNOSIS — O99.340 MATERNAL MENTAL DISORDER, ANTEPARTUM: ICD-10-CM

## 2024-05-14 DIAGNOSIS — F12.90 MARIJUANA USE DURING PREGNANCY: ICD-10-CM

## 2024-05-14 DIAGNOSIS — F11.20 POLYSUBSTANCE DEPENDENCE INCLUDING OPIOID TYPE DRUG, CONTINUOUS USE (HCC): ICD-10-CM

## 2024-05-14 DIAGNOSIS — F98.8 ATTENTION DEFICIT DISORDER, UNSPECIFIED HYPERACTIVITY PRESENCE: ICD-10-CM

## 2024-05-14 DIAGNOSIS — Z86.19 HISTORY OF SYPHILIS: ICD-10-CM

## 2024-05-14 DIAGNOSIS — O99.320 MARIJUANA USE DURING PREGNANCY: ICD-10-CM

## 2024-05-14 DIAGNOSIS — M25.551 PAIN OF RIGHT HIP JOINT: ICD-10-CM

## 2024-05-14 DIAGNOSIS — O09.891 MEDICATION EXPOSURE DURING FIRST TRIMESTER OF PREGNANCY: ICD-10-CM

## 2024-05-14 DIAGNOSIS — M54.9 BACK PAIN COMPLICATING PREGNANCY IN THIRD TRIMESTER: ICD-10-CM

## 2024-05-14 DIAGNOSIS — O99.891 BACK PAIN COMPLICATING PREGNANCY IN THIRD TRIMESTER: ICD-10-CM

## 2024-05-14 DIAGNOSIS — Z3A.36 36 WEEKS GESTATION OF PREGNANCY: ICD-10-CM

## 2024-05-14 DIAGNOSIS — F39 MOOD DISORDER (HCC): ICD-10-CM

## 2024-05-14 DIAGNOSIS — O09.893 SHORT INTERVAL BETWEEN PREGNANCIES AFFECTING PREGNANCY IN THIRD TRIMESTER, ANTEPARTUM: ICD-10-CM

## 2024-05-14 DIAGNOSIS — F19.20 POLYSUBSTANCE DEPENDENCE INCLUDING OPIOID TYPE DRUG, CONTINUOUS USE (HCC): ICD-10-CM

## 2024-05-14 DIAGNOSIS — F19.11 HISTORY OF DRUG ABUSE (HCC): ICD-10-CM

## 2024-05-14 DIAGNOSIS — R76.8 FALSE POSITIVE SEROLOGICAL TEST FOR HEPATITIS C: ICD-10-CM

## 2024-05-14 DIAGNOSIS — O99.013 ANEMIA DURING PREGNANCY IN THIRD TRIMESTER: ICD-10-CM

## 2024-05-14 PROCEDURE — 99214 OFFICE O/P EST MOD 30 MIN: CPT | Performed by: OBSTETRICS & GYNECOLOGY

## 2024-05-14 PROCEDURE — 76819 FETAL BIOPHYS PROFIL W/O NST: CPT | Performed by: OBSTETRICS & GYNECOLOGY

## 2024-05-14 PROCEDURE — 76816 OB US FOLLOW-UP PER FETUS: CPT | Performed by: OBSTETRICS & GYNECOLOGY

## 2024-05-14 RX ORDER — OXYCODONE HYDROCHLORIDE 5 MG/1
TABLET ORAL
COMMUNITY
Start: 2024-05-10

## 2024-05-14 ASSESSMENT — PATIENT HEALTH QUESTIONNAIRE - PHQ9
SUM OF ALL RESPONSES TO PHQ QUESTIONS 1-9: 0
1. LITTLE INTEREST OR PLEASURE IN DOING THINGS: NOT AT ALL
2. FEELING DOWN, DEPRESSED OR HOPELESS: NOT AT ALL
SUM OF ALL RESPONSES TO PHQ QUESTIONS 1-9: 0
SUM OF ALL RESPONSES TO PHQ9 QUESTIONS 1 & 2: 0
SUM OF ALL RESPONSES TO PHQ QUESTIONS 1-9: 0
SUM OF ALL RESPONSES TO PHQ QUESTIONS 1-9: 0

## 2024-05-14 NOTE — PATIENT INSTRUCTIONS
Resources for Depression/Anxiety  Postpartum Support International (PSI).    PSI Warmline:  2-192-059-4PPD (9916).  WWW.[email or web address].NET    Mom's IMPACTT  [email or web address]-[email or web address]      Suicide & Crisis Lifeline  Dial 988    National Pregnancy Registry for Psychiatric Medications  National Pregnancy Registry for Psychiatric Medications  - [facility] ([facility].org)        In order to optimize maternal, fetal, and  health, we recommend the following vaccinations.   Flu- yearly (https://www.[employer] [facility].org/flu-facts-for-pregnancy)  Consider Covid vaccination/booster (https://www.[employer] [facility]regnancyinfo.org/covid-19-pregnancy)  TDaP after 28 weeks each pregnancy (https://www.[employer] [facility] [facility].org/tdap)  Consider RSV vaccine 32-36 weeks of pregnancy, if Sept- January.  (https://www.highriskpregnancyinfo.org/rsv)

## 2024-05-14 NOTE — PROGRESS NOTES
Negative NIPT. For full details review formal ultrasound report.  F/U [facility] at 32 weeks  Repeat growth scans every 4 weeks at [facility]. OB cc'd.   24 [facility]: Reassuring fetal status. Growth today appropriate EFW appropriate AC; BPP- 8/8, PAULA- WNL. For full details review formal ultrasound report.   24 [facility]: Reassuring fetal status. Growth today appropriate EFW appropriate AC; BPP- 8/8, PAULA- WNL. For full details review formal ultrasound report.   Continue weekly testing at [facility].  Plan delivery for 39-40 weeks.  F/U [facility] PRN.        Marijuana use during pregnancy 2024     Priority: Medium     Overview Note:     2023 UDS negative.   2024 UDS positive. Titer=61 Made aware of policy(my chart)  2024 repeat UDS- neg    24 [facility]: + UDS for THC. Reports using Delta 8 products not THC. Pt encouraged to d/c use. Referred to [facility]. NEEDS CORD SEGMENT AT DELIVERY.    24: +UDS for THC, cannabinoids negative.      Short interval between pregnancies affecting pregnancy in third trimester, antepartum 2023     Priority: Medium     Overview Note:     - 3/2023  TA Cervical eval reassuring     24 [facility]: CL-3.0 cm. Denies vaginal pressure.       Medication exposure during first trimester of pregnancy 2023     Priority: Medium     Overview Note:     Pt taking Neurontin 600 mg TID, Adderall XR 15 mg q day, Tramadol 50 mg PRN q 6 hrs and Klonopin 0.5 mg PRN TID.    23 [facility]: Pt reports she saw PCP,  at [facility] on 23 and he did not change or adjust any medications. Reports she was told \"benefit outweighs risk.\"    We discussed the potential risks of medication exposure in pregnancy. None of her current medications are believed to be teratogenic. However, several (neurontin, tramadol, and klonopin) have the potential to cause withdrawal in the  ( abstinence syndrome or JODI). She was counseled

## 2024-05-17 ENCOUNTER — ROUTINE PRENATAL (OUTPATIENT)
Dept: OBGYN CLINIC | Age: 34
End: 2024-05-17

## 2024-05-17 ENCOUNTER — PROCEDURE VISIT (OUTPATIENT)
Dept: OBGYN CLINIC | Age: 34
End: 2024-05-17

## 2024-05-17 VITALS — BODY MASS INDEX: 33.91 KG/M2 | SYSTOLIC BLOOD PRESSURE: 124 MMHG | WEIGHT: 210.1 LBS | DIASTOLIC BLOOD PRESSURE: 79 MMHG

## 2024-05-17 DIAGNOSIS — O09.891 MEDICATION EXPOSURE DURING FIRST TRIMESTER OF PREGNANCY: ICD-10-CM

## 2024-05-17 DIAGNOSIS — Z3A.36 36 WEEKS GESTATION OF PREGNANCY: ICD-10-CM

## 2024-05-17 DIAGNOSIS — F11.20 POLYSUBSTANCE DEPENDENCE INCLUDING OPIOID TYPE DRUG, CONTINUOUS USE (HCC): ICD-10-CM

## 2024-05-17 DIAGNOSIS — O99.213 OBESITY AFFECTING PREGNANCY IN THIRD TRIMESTER, UNSPECIFIED OBESITY TYPE: ICD-10-CM

## 2024-05-17 DIAGNOSIS — Z13.89 SCREENING FOR GENITOURINARY CONDITION: ICD-10-CM

## 2024-05-17 DIAGNOSIS — O09.893 SHORT INTERVAL BETWEEN PREGNANCIES AFFECTING PREGNANCY IN THIRD TRIMESTER, ANTEPARTUM: ICD-10-CM

## 2024-05-17 DIAGNOSIS — Z34.83 PRENATAL CARE, SUBSEQUENT PREGNANCY, THIRD TRIMESTER: Primary | ICD-10-CM

## 2024-05-17 DIAGNOSIS — O09.93 HIGH-RISK PREGNANCY IN THIRD TRIMESTER: Primary | ICD-10-CM

## 2024-05-17 DIAGNOSIS — Z11.3 SCREENING FOR STDS (SEXUALLY TRANSMITTED DISEASES): ICD-10-CM

## 2024-05-17 DIAGNOSIS — Z36.85 ANTENATAL SCREENING FOR STREPTOCOCCUS B: ICD-10-CM

## 2024-05-17 DIAGNOSIS — F19.20 POLYSUBSTANCE DEPENDENCE INCLUDING OPIOID TYPE DRUG, CONTINUOUS USE (HCC): ICD-10-CM

## 2024-05-17 LAB
GLUCOSE URINE, POC: NEGATIVE
PROTEIN,URINE, POC: NORMAL

## 2024-05-19 LAB
BACTERIA SPEC CULT: NORMAL
SERVICE CMNT-IMP: NORMAL

## 2024-05-21 LAB
BACTERIA SPEC CULT: NORMAL
C TRACH RRNA SPEC QL NAA+PROBE: NEGATIVE
N GONORRHOEA RRNA SPEC QL NAA+PROBE: NEGATIVE
SERVICE CMNT-IMP: NORMAL
SPECIMEN SOURCE: NORMAL
T VAGINALIS RRNA SPEC QL NAA+PROBE: NEGATIVE

## 2024-05-23 ENCOUNTER — HOSPITAL ENCOUNTER (OUTPATIENT)
Age: 34
Discharge: HOME | End: 2024-05-23
Attending: OBSTETRICS & GYNECOLOGY | Admitting: OBSTETRICS & GYNECOLOGY
Payer: MEDICAID

## 2024-05-23 VITALS
DIASTOLIC BLOOD PRESSURE: 69 MMHG | RESPIRATION RATE: 17 BRPM | BODY MASS INDEX: 33.91 KG/M2 | SYSTOLIC BLOOD PRESSURE: 127 MMHG | OXYGEN SATURATION: 96 % | TEMPERATURE: 97.6 F | HEART RATE: 91 BPM | HEIGHT: 66 IN

## 2024-05-23 PROBLEM — M54.9 BACK PAIN AFFECTING PREGNANCY IN THIRD TRIMESTER: Status: RESOLVED | Noted: 2024-05-23 | Resolved: 2024-05-23

## 2024-05-23 PROBLEM — M54.9 BACK PAIN AFFECTING PREGNANCY IN THIRD TRIMESTER: Status: ACTIVE | Noted: 2024-05-23

## 2024-05-23 PROBLEM — O99.891 BACK PAIN AFFECTING PREGNANCY IN THIRD TRIMESTER: Status: ACTIVE | Noted: 2024-05-23

## 2024-05-23 PROBLEM — O99.891 BACK PAIN AFFECTING PREGNANCY IN THIRD TRIMESTER: Status: RESOLVED | Noted: 2024-05-23 | Resolved: 2024-05-23

## 2024-05-23 PROCEDURE — 99283 EMERGENCY DEPT VISIT LOW MDM: CPT

## 2024-05-23 RX ORDER — CYCLOBENZAPRINE HCL 5 MG
5 TABLET ORAL 2 TIMES DAILY PRN
Qty: 10 TABLET | Refills: 0 | Status: SHIPPED | OUTPATIENT
Start: 2024-05-23 | End: 2024-06-02
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
## 2024-05-23 NOTE — PROGRESS NOTES
Discharge orders received. Instructions reviewed with pt. Pt verbalized understanding and ambulated off floor without difficulty.

## 2024-05-23 NOTE — DISCHARGE INSTRUCTIONS
Week 37 of Your Pregnancy: Care Instructions    Most babies are born between 37 and 40 weeks.    This is a good time to pack a bag to take with you to the birth. Then it will be ready to go when you are.    Learn about breastfeeding.  For example, find out about ways to hold your baby to make breastfeeding easier. And think about learning how to pump and store milk.     Know that crying is normal.  It's common for babies to cry 1 to 3 hours a day. Some cry more, and some cry less.     Learn why babies cry.  They may be hungry; have gas; need a diaper change; or feel cold, warm, tired, lonely, or tense. Sometimes they cry for unknown reasons.     Think about what will help you stay calm when your baby cries.  Taking slow, deep breaths can help. So can taking a break. It's okay to put your baby somewhere safe (like their crib) and walk away for a few minutes.     Learn about safe sleep for your baby.  Always put your baby to sleep on their back. Place them alone in a crib or bassinet with a firm, flat surface. Keep soft items like stuffed animals out of the crib.     Learn what to expect with  poop.  Your baby will have their own bowel patterns. Some babies have several bowel movements a day. Some have fewer.     Know that  babies will often have loose, yellow bowel movements.  Formula-fed babies have more formed stools. If your baby's poop looks like pellets, your baby is constipated.   Follow-up care is a key part of your treatment and safety. Be sure to make and go to all appointments, and call your doctor if you are having problems. It's also a good idea to know your test results and keep a list of the medicines you take.  Where can you learn more?  Go to https://www.[employer].net/patientEd and enter N257 to learn more about \"Week 37 of Your Pregnancy: Care Instructions.\"  Current as of: July 10, 2023               Content Version: 14.0  © 8018-0256 Healthwise, Incorporated.   Care  fever.  Unusual pain or soreness in your uterus or lower belly.  Avoid foods that may be harmful.  Don't eat raw meat, deli meat, raw seafood, or raw eggs.  Avoid soft cheese and unpasteurized dairy, like Brie and blue cheese.  Avoid fish that are high in mercury. These include shark, swordfish, rashid mackerel, marlin, orange roughy, and bigeye tuna, as well as tilefish from the [facility].  If you smoke or vape, quit or cut back as much as you can. Talk to your doctor if you need help quitting.  If you use alcohol, marijuana, or other drugs, quit or cut back as much as you can. It's safest not to use them at all. Talk to your doctor if you need help quitting.  Follow your doctor's directions about activity. Your doctor will let you know how much exercise you can do.  Ask your doctor if you can have sex. If you are at risk for early labor, your doctor may ask you to not have sex.  Take care to avoid falling. Changes in your body during pregnancy, such as a growing belly, can make you more likely to fall. Sports such as bicycling, skiing, or in-line skating can increase your risk.  Avoid risky activities like horseback or motorcycle riding, water-skiing, scuba diving, and exercising at a high altitude (above 6,000 feet). If you live in a place with a high altitude, talk to your doctor about how you can exercise safely.  Avoid things that can make your body too hot and may be harmful to your pregnancy, such as a hot tub or sauna. Or talk with your doctor before doing anything that raises your body temperature. Your doctor can tell you if it's safe.  Do not take any over-the-counter or herbal medicines or supplements without talking to your doctor or pharmacist first.  When should you call for help?   Call 911  anytime you think you may need emergency care. For example, call if:    You passed out (lost consciousness).     You have a seizure.     You have severe vaginal bleeding. This means that you are soaking

## 2024-05-23 NOTE — PROGRESS NOTES
Pt reports to [facility] with c/o of lower back pain and hip pain. Pt states it also radiates down her hip to the backs of her thighs. Pt states that it comes and goes more than 4 times a day for the past few days. Pt states that she's also had a lot of pelvic pressure for about a week.

## 2024-05-30 ENCOUNTER — PROCEDURE VISIT (OUTPATIENT)
Dept: OBGYN CLINIC | Age: 34
End: 2024-05-30
Payer: MEDICAID

## 2024-05-30 ENCOUNTER — ROUTINE PRENATAL (OUTPATIENT)
Dept: OBGYN CLINIC | Age: 34
End: 2024-05-30
Payer: MEDICAID

## 2024-05-30 VITALS — SYSTOLIC BLOOD PRESSURE: 130 MMHG | WEIGHT: 209.3 LBS | BODY MASS INDEX: 33.78 KG/M2 | DIASTOLIC BLOOD PRESSURE: 80 MMHG

## 2024-05-30 DIAGNOSIS — F11.20 POLYSUBSTANCE DEPENDENCE INCLUDING OPIOID TYPE DRUG, CONTINUOUS USE (HCC): ICD-10-CM

## 2024-05-30 DIAGNOSIS — O09.893 SHORT INTERVAL BETWEEN PREGNANCIES AFFECTING PREGNANCY IN THIRD TRIMESTER, ANTEPARTUM: ICD-10-CM

## 2024-05-30 DIAGNOSIS — Z3A.38 38 WEEKS GESTATION OF PREGNANCY: ICD-10-CM

## 2024-05-30 DIAGNOSIS — Z13.89 SCREENING FOR GENITOURINARY CONDITION: ICD-10-CM

## 2024-05-30 DIAGNOSIS — F19.20 POLYSUBSTANCE DEPENDENCE INCLUDING OPIOID TYPE DRUG, CONTINUOUS USE (HCC): ICD-10-CM

## 2024-05-30 DIAGNOSIS — O09.891 MEDICATION EXPOSURE DURING FIRST TRIMESTER OF PREGNANCY: ICD-10-CM

## 2024-05-30 DIAGNOSIS — O09.93 HIGH-RISK PREGNANCY IN THIRD TRIMESTER: Primary | ICD-10-CM

## 2024-05-30 LAB
GLUCOSE URINE, POC: NEGATIVE
PROTEIN,URINE, POC: NEGATIVE

## 2024-05-30 PROCEDURE — 99203 OFFICE O/P NEW LOW 30 MIN: CPT | Performed by: OBSTETRICS & GYNECOLOGY

## 2024-05-30 PROCEDURE — 76819 FETAL BIOPHYS PROFIL W/O NST: CPT | Performed by: OBSTETRICS & GYNECOLOGY

## 2024-05-30 PROCEDURE — 81002 URINALYSIS NONAUTO W/O SCOPE: CPT | Performed by: OBSTETRICS & GYNECOLOGY

## 2024-05-30 NOTE — PROGRESS NOTES
Patient Active Problem List    Diagnosis Date Noted    Polysubstance dependence including opioid type drug, continuous use (HCC) 2023     Priority: High     23 [facility]: Currently taking: hydrocodone 5/325 (90 Rx monthly by PCP), Klonopin 0.5mg (90 Rx by PCP monthly), adderall 15mg XR BID (60 tab Rx by PCP monthly), gabapentin 600mg TID ( 90tab Rx by PCP monthly), fluoxetine 10-30mg daily (Rx prescribed by multiple PCPs monthly). Previously on tramadol 50mg (120Rx by PCP monthly until 2023).     HIGH risk for  withdrawal with polysubstance use exacerbated by nicotine use from vaping.   Counseled today to minimize use, change to less impactful medications. Nonpharmacologic options for mood and pain reviewed orally and in writing.     Last UDS negative for all substances in November.     24 [facility]: UDS on 24 positive for Benzo, Amphetamine, Opiates and THC. Has appointment mid February with , PCP who manages above medications.   Recommend lowest doses possible; risks of vaping in pregnancy reviewed.   Probability of  withdrawal again reviewed. Plan Shanu consult ~34 weeks.     24 [facility]: Reports currently taking Klonopin, Adderall, Oxycodone, Gabapentin and Prozac daily. Scheduled Shaun Consult with  for 24 at 1100.     24 [facility]: Reports no change in daily medication regimen. Pt seen by Dr. Ruffin for Shaun Consult on 24.          Short interval between pregnancies affecting pregnancy in third trimester, antepartum 2023     Priority: Medium     - 3/2023  TA Cervical eval reassuring     24 [facility]: CL-3.0 cm. Denies vaginal pressure.       Anemia during pregnancy in third trimester 2024     3/20/24: Hgb- 11.0.    24 [facility]: Reports taking iron supplement daily.         Marijuana use during pregnancy 2024 UDS negative.   2024 UDS positive. Titer=61 Made aware of policy(my chart)  2024 repeat UDS-

## 2024-05-31 ENCOUNTER — ANESTHESIA EVENT (OUTPATIENT)
Dept: LABOR AND DELIVERY | Age: 34
End: 2024-05-31
Payer: MEDICAID

## 2024-05-31 ENCOUNTER — ANESTHESIA (OUTPATIENT)
Dept: LABOR AND DELIVERY | Age: 34
End: 2024-05-31
Payer: MEDICAID

## 2024-05-31 ENCOUNTER — APPOINTMENT (OUTPATIENT)
Dept: LABOR AND DELIVERY | Age: 34
DRG: 560 | End: 2024-05-31
Payer: MEDICAID

## 2024-05-31 ENCOUNTER — HOSPITAL ENCOUNTER (INPATIENT)
Age: 34
LOS: 2 days | Discharge: HOME | DRG: 560 | End: 2024-06-02
Attending: OBSTETRICS & GYNECOLOGY | Admitting: OBSTETRICS & GYNECOLOGY
Payer: MEDICAID

## 2024-05-31 PROBLEM — O41.03X0 OLIGOHYDRAMNIOS ANTEPARTUM, THIRD TRIMESTER, NOT APPLICABLE OR UNSPECIFIED FETUS: Status: ACTIVE | Noted: 2024-05-31

## 2024-05-31 LAB
ABO + RH BLD: NORMAL
BASOPHILS # BLD: 0 K/UL (ref 0–0.2)
BASOPHILS NFR BLD: 1 % (ref 0–2)
BLOOD GROUP ANTIBODIES SERPL: NORMAL
DIFFERENTIAL METHOD BLD: ABNORMAL
EOSINOPHIL # BLD: 0.1 K/UL (ref 0–0.8)
EOSINOPHIL NFR BLD: 1 % (ref 0.5–7.8)
ERYTHROCYTE [DISTWIDTH] IN BLOOD BY AUTOMATED COUNT: 15.1 % (ref 11.9–14.6)
HCT VFR BLD AUTO: 32.8 % (ref 35.8–46.3)
HGB BLD-MCNC: 11 G/DL (ref 11.7–15.4)
IMM GRANULOCYTES # BLD AUTO: 0 K/UL (ref 0–0.5)
IMM GRANULOCYTES NFR BLD AUTO: 1 % (ref 0–5)
LYMPHOCYTES # BLD: 1.8 K/UL (ref 0.5–4.6)
LYMPHOCYTES NFR BLD: 31 % (ref 13–44)
MCH RBC QN AUTO: 30.8 PG (ref 26.1–32.9)
MCHC RBC AUTO-ENTMCNC: 33.5 G/DL (ref 31.4–35)
MCV RBC AUTO: 91.9 FL (ref 82–102)
MONOCYTES # BLD: 0.5 K/UL (ref 0.1–1.3)
MONOCYTES NFR BLD: 9 % (ref 4–12)
NEUTS SEG # BLD: 3.3 K/UL (ref 1.7–8.2)
NEUTS SEG NFR BLD: 58 % (ref 43–78)
NRBC # BLD: 0 K/UL (ref 0–0.2)
PLATELET # BLD AUTO: 256 K/UL (ref 150–450)
PMV BLD AUTO: 10.5 FL (ref 9.4–12.3)
RBC # BLD AUTO: 3.57 M/UL (ref 4.05–5.2)
RPR SER QL: NORMAL
SPECIMEN EXP DATE BLD: NORMAL
T PALLIDUM AB SER QL IA: REACTIVE
WBC # BLD AUTO: 5.7 K/UL (ref 4.3–11.1)

## 2024-05-31 PROCEDURE — 7210000100 HC LABOR FEE PER 1 HR

## 2024-05-31 PROCEDURE — 6360000002 HC RX W HCPCS: Performed by: OBSTETRICS & GYNECOLOGY

## 2024-05-31 PROCEDURE — 10907ZC DRAINAGE OF AMNIOTIC FLUID, THERAPEUTIC FROM PRODUCTS OF CONCEPTION, VIA NATURAL OR ARTIFICIAL OPENING: ICD-10-PCS | Performed by: OBSTETRICS & GYNECOLOGY

## 2024-05-31 PROCEDURE — 86900 BLOOD TYPING SEROLOGIC ABO: CPT

## 2024-05-31 PROCEDURE — 6370000000 HC RX 637 (ALT 250 FOR IP): Performed by: OBSTETRICS & GYNECOLOGY

## 2024-05-31 PROCEDURE — 1100000000 HC RM PRIVATE

## 2024-05-31 PROCEDURE — 85025 COMPLETE CBC W/AUTO DIFF WBC: CPT

## 2024-05-31 PROCEDURE — 86592 SYPHILIS TEST NON-TREP QUAL: CPT

## 2024-05-31 PROCEDURE — 7220000101 HC DELIVERY VAGINAL/SINGLE

## 2024-05-31 PROCEDURE — 7100000010 HC PHASE II RECOVERY - FIRST 15 MIN

## 2024-05-31 PROCEDURE — 86780 TREPONEMA PALLIDUM: CPT

## 2024-05-31 PROCEDURE — 4A1HXCZ MONITORING OF PRODUCTS OF CONCEPTION, CARDIAC RATE, EXTERNAL APPROACH: ICD-10-PCS | Performed by: OBSTETRICS & GYNECOLOGY

## 2024-05-31 PROCEDURE — 2580000003 HC RX 258: Performed by: OBSTETRICS & GYNECOLOGY

## 2024-05-31 PROCEDURE — 3700000025 EPIDURAL BLOCK: Performed by: ANESTHESIOLOGY

## 2024-05-31 PROCEDURE — 86901 BLOOD TYPING SEROLOGIC RH(D): CPT

## 2024-05-31 PROCEDURE — 3E033VJ INTRODUCTION OF OTHER HORMONE INTO PERIPHERAL VEIN, PERCUTANEOUS APPROACH: ICD-10-PCS | Performed by: OBSTETRICS & GYNECOLOGY

## 2024-05-31 PROCEDURE — 7100000011 HC PHASE II RECOVERY - ADDTL 15 MIN

## 2024-05-31 PROCEDURE — 51701 INSERT BLADDER CATHETER: CPT

## 2024-05-31 PROCEDURE — 6360000002 HC RX W HCPCS

## 2024-05-31 PROCEDURE — 86850 RBC ANTIBODY SCREEN: CPT

## 2024-05-31 PROCEDURE — 59409 OBSTETRICAL CARE: CPT | Performed by: OBSTETRICS & GYNECOLOGY

## 2024-05-31 RX ORDER — SODIUM CHLORIDE, SODIUM LACTATE, POTASSIUM CHLORIDE, CALCIUM CHLORIDE 600; 310; 30; 20 MG/100ML; MG/100ML; MG/100ML; MG/100ML
INJECTION, SOLUTION INTRAVENOUS CONTINUOUS
Status: DISCONTINUED | OUTPATIENT
Start: 2024-05-31 | End: 2024-06-02 | Stop reason: HOSPADM

## 2024-05-31 RX ORDER — CLINDAMYCIN PHOSPHATE 900 MG/50ML
900 INJECTION, SOLUTION INTRAVENOUS EVERY 8 HOURS
Status: DISCONTINUED | OUTPATIENT
Start: 2024-05-31 | End: 2024-05-31 | Stop reason: CLARIF

## 2024-05-31 RX ORDER — DOCUSATE SODIUM 100 MG/1
100 CAPSULE, LIQUID FILLED ORAL 2 TIMES DAILY
Status: DISCONTINUED | OUTPATIENT
Start: 2024-05-31 | End: 2024-05-31

## 2024-05-31 RX ORDER — ROPIVACAINE HYDROCHLORIDE 2 MG/ML
INJECTION, SOLUTION EPIDURAL; INFILTRATION; PERINEURAL CONTINUOUS PRN
Status: DISCONTINUED | OUTPATIENT
Start: 2024-05-31 | End: 2024-05-31 | Stop reason: SDUPTHER

## 2024-05-31 RX ORDER — SODIUM CHLORIDE 0.9 % (FLUSH) 0.9 %
5-40 SYRINGE (ML) INJECTION EVERY 12 HOURS SCHEDULED
Status: DISCONTINUED | OUTPATIENT
Start: 2024-05-31 | End: 2024-06-02 | Stop reason: HOSPADM

## 2024-05-31 RX ORDER — OXYCODONE HYDROCHLORIDE 5 MG/1
10 TABLET ORAL EVERY 4 HOURS PRN
Status: DISCONTINUED | OUTPATIENT
Start: 2024-05-31 | End: 2024-06-02 | Stop reason: HOSPADM

## 2024-05-31 RX ORDER — SODIUM CHLORIDE, SODIUM LACTATE, POTASSIUM CHLORIDE, AND CALCIUM CHLORIDE .6; .31; .03; .02 G/100ML; G/100ML; G/100ML; G/100ML
500 INJECTION, SOLUTION INTRAVENOUS PRN
Status: DISCONTINUED | OUTPATIENT
Start: 2024-05-31 | End: 2024-05-31

## 2024-05-31 RX ORDER — CARBOPROST TROMETHAMINE 250 UG/ML
250 INJECTION, SOLUTION INTRAMUSCULAR PRN
OUTPATIENT
Start: 2024-05-31

## 2024-05-31 RX ORDER — DOCUSATE SODIUM 100 MG/1
100 CAPSULE, LIQUID FILLED ORAL 2 TIMES DAILY
Status: DISCONTINUED | OUTPATIENT
Start: 2024-05-31 | End: 2024-06-02 | Stop reason: HOSPADM

## 2024-05-31 RX ORDER — TERBUTALINE SULFATE 1 MG/ML
0.25 INJECTION, SOLUTION SUBCUTANEOUS ONCE
Status: DISCONTINUED | OUTPATIENT
Start: 2024-05-31 | End: 2024-05-31

## 2024-05-31 RX ORDER — SODIUM CHLORIDE 9 MG/ML
INJECTION, SOLUTION INTRAVENOUS PRN
Status: DISCONTINUED | OUTPATIENT
Start: 2024-05-31 | End: 2024-06-02 | Stop reason: HOSPADM

## 2024-05-31 RX ORDER — ACETAMINOPHEN 325 MG/1
650 TABLET ORAL EVERY 4 HOURS PRN
Status: DISCONTINUED | OUTPATIENT
Start: 2024-05-31 | End: 2024-05-31

## 2024-05-31 RX ORDER — MISOPROSTOL 200 UG/1
400 TABLET ORAL PRN
OUTPATIENT
Start: 2024-05-31

## 2024-05-31 RX ORDER — FLUOXETINE HYDROCHLORIDE 20 MG/1
20 CAPSULE ORAL DAILY
Status: DISCONTINUED | OUTPATIENT
Start: 2024-05-31 | End: 2024-06-01

## 2024-05-31 RX ORDER — METHYLERGONOVINE MALEATE 0.2 MG/ML
200 INJECTION INTRAVENOUS PRN
OUTPATIENT
Start: 2024-05-31

## 2024-05-31 RX ORDER — SODIUM CHLORIDE 0.9 % (FLUSH) 0.9 %
5-40 SYRINGE (ML) INJECTION EVERY 12 HOURS SCHEDULED
Status: DISCONTINUED | OUTPATIENT
Start: 2024-05-31 | End: 2024-05-31

## 2024-05-31 RX ORDER — OXYCODONE HYDROCHLORIDE 5 MG/1
5 TABLET ORAL EVERY 4 HOURS PRN
Status: DISCONTINUED | OUTPATIENT
Start: 2024-05-31 | End: 2024-06-02 | Stop reason: HOSPADM

## 2024-05-31 RX ORDER — ONDANSETRON 2 MG/ML
4 INJECTION INTRAMUSCULAR; INTRAVENOUS EVERY 6 HOURS PRN
OUTPATIENT
Start: 2024-05-31

## 2024-05-31 RX ORDER — METHYLERGONOVINE MALEATE 0.2 MG/ML
200 INJECTION INTRAVENOUS PRN
Status: DISCONTINUED | OUTPATIENT
Start: 2024-05-31 | End: 2024-06-02 | Stop reason: HOSPADM

## 2024-05-31 RX ORDER — FAMOTIDINE 20 MG/1
20 TABLET, FILM COATED ORAL 2 TIMES DAILY
Status: DISCONTINUED | OUTPATIENT
Start: 2024-05-31 | End: 2024-06-01

## 2024-05-31 RX ORDER — TRANEXAMIC ACID 10 MG/ML
1000 INJECTION, SOLUTION INTRAVENOUS
Status: ACTIVE | OUTPATIENT
Start: 2024-05-31 | End: 2024-06-01

## 2024-05-31 RX ORDER — ACETAMINOPHEN 500 MG
1000 TABLET ORAL EVERY 8 HOURS SCHEDULED
Status: DISCONTINUED | OUTPATIENT
Start: 2024-05-31 | End: 2024-06-02 | Stop reason: HOSPADM

## 2024-05-31 RX ORDER — CLONAZEPAM 1 MG/1
0.5 TABLET ORAL 3 TIMES DAILY PRN
Status: DISCONTINUED | OUTPATIENT
Start: 2024-05-31 | End: 2024-06-01 | Stop reason: DRUGHIGH

## 2024-05-31 RX ORDER — SODIUM CHLORIDE 0.9 % (FLUSH) 0.9 %
5-40 SYRINGE (ML) INJECTION PRN
Status: DISCONTINUED | OUTPATIENT
Start: 2024-05-31 | End: 2024-06-02 | Stop reason: HOSPADM

## 2024-05-31 RX ORDER — LANOLIN
CREAM (ML) TOPICAL PRN
Status: DISCONTINUED | OUTPATIENT
Start: 2024-05-31 | End: 2024-06-02 | Stop reason: HOSPADM

## 2024-05-31 RX ORDER — SODIUM CHLORIDE 9 MG/ML
25 INJECTION, SOLUTION INTRAVENOUS PRN
Status: DISCONTINUED | OUTPATIENT
Start: 2024-05-31 | End: 2024-05-31

## 2024-05-31 RX ORDER — DEXTROAMPHETAMINE SACCHARATE, AMPHETAMINE ASPARTATE MONOHYDRATE, DEXTROAMPHETAMINE SULFATE AND AMPHETAMINE SULFATE 3.75; 3.75; 3.75; 3.75 MG/1; MG/1; MG/1; MG/1
15 CAPSULE, EXTENDED RELEASE ORAL 2 TIMES DAILY
Status: DISCONTINUED | OUTPATIENT
Start: 2024-05-31 | End: 2024-05-31 | Stop reason: RX

## 2024-05-31 RX ORDER — TRANEXAMIC ACID 10 MG/ML
1000 INJECTION, SOLUTION INTRAVENOUS
OUTPATIENT
Start: 2024-05-31 | End: 2024-06-01

## 2024-05-31 RX ORDER — IBUPROFEN 800 MG/1
800 TABLET ORAL EVERY 8 HOURS SCHEDULED
Status: DISCONTINUED | OUTPATIENT
Start: 2024-06-01 | End: 2024-06-02 | Stop reason: HOSPADM

## 2024-05-31 RX ORDER — GABAPENTIN 300 MG/1
600 CAPSULE ORAL 3 TIMES DAILY
Status: DISCONTINUED | OUTPATIENT
Start: 2024-05-31 | End: 2024-06-02 | Stop reason: HOSPADM

## 2024-05-31 RX ORDER — MISOPROSTOL 200 UG/1
200 TABLET ORAL EVERY 6 HOURS PRN
Status: DISCONTINUED | OUTPATIENT
Start: 2024-05-31 | End: 2024-06-02 | Stop reason: HOSPADM

## 2024-05-31 RX ORDER — SODIUM CHLORIDE 0.9 % (FLUSH) 0.9 %
5-40 SYRINGE (ML) INJECTION PRN
Status: DISCONTINUED | OUTPATIENT
Start: 2024-05-31 | End: 2024-05-31

## 2024-05-31 RX ORDER — ONDANSETRON 4 MG/1
4 TABLET, ORALLY DISINTEGRATING ORAL EVERY 6 HOURS PRN
OUTPATIENT
Start: 2024-05-31

## 2024-05-31 RX ORDER — SODIUM CHLORIDE, SODIUM LACTATE, POTASSIUM CHLORIDE, CALCIUM CHLORIDE 600; 310; 30; 20 MG/100ML; MG/100ML; MG/100ML; MG/100ML
INJECTION, SOLUTION INTRAVENOUS CONTINUOUS
OUTPATIENT
Start: 2024-05-31

## 2024-05-31 RX ORDER — HYDROMORPHONE HYDROCHLORIDE 1 MG/ML
1 INJECTION, SOLUTION INTRAMUSCULAR; INTRAVENOUS; SUBCUTANEOUS
Status: DISCONTINUED | OUTPATIENT
Start: 2024-05-31 | End: 2024-06-02 | Stop reason: HOSPADM

## 2024-05-31 RX ORDER — SODIUM CHLORIDE, SODIUM LACTATE, POTASSIUM CHLORIDE, AND CALCIUM CHLORIDE .6; .31; .03; .02 G/100ML; G/100ML; G/100ML; G/100ML
1000 INJECTION, SOLUTION INTRAVENOUS PRN
Status: DISCONTINUED | OUTPATIENT
Start: 2024-05-31 | End: 2024-05-31

## 2024-05-31 RX ADMIN — ACETAMINOPHEN 650 MG: 325 TABLET, FILM COATED ORAL at 13:35

## 2024-05-31 RX ADMIN — ACETAMINOPHEN 1000 MG: 500 TABLET, FILM COATED ORAL at 21:35

## 2024-05-31 RX ADMIN — CLONAZEPAM 0.5 MG: 1 TABLET ORAL at 21:35

## 2024-05-31 RX ADMIN — FLUOXETINE HYDROCHLORIDE 20 MG: 20 CAPSULE ORAL at 19:42

## 2024-05-31 RX ADMIN — DOCUSATE SODIUM 100 MG: 100 CAPSULE, LIQUID FILLED ORAL at 21:35

## 2024-05-31 RX ADMIN — OXYTOCIN 2 MILLI-UNITS/MIN: 10 INJECTION, SOLUTION INTRAMUSCULAR; INTRAVENOUS at 10:41

## 2024-05-31 RX ADMIN — ROPIVACAINE HYDROCHLORIDE 8 ML/HR: 2 INJECTION, SOLUTION EPIDURAL; INFILTRATION; PERINEURAL at 12:43

## 2024-05-31 RX ADMIN — OXYCODONE 10 MG: 5 TABLET ORAL at 23:12

## 2024-05-31 RX ADMIN — FAMOTIDINE 20 MG: 20 TABLET, FILM COATED ORAL at 21:35

## 2024-05-31 RX ADMIN — GABAPENTIN 600 MG: 300 CAPSULE ORAL at 21:35

## 2024-05-31 RX ADMIN — OXYCODONE 10 MG: 5 TABLET ORAL at 19:22

## 2024-05-31 ASSESSMENT — PAIN SCALES - GENERAL
PAINLEVEL_OUTOF10: 4
PAINLEVEL_OUTOF10: 10
PAINLEVEL_OUTOF10: 9

## 2024-05-31 ASSESSMENT — PAIN DESCRIPTION - DESCRIPTORS
DESCRIPTORS: ACHING;CRAMPING
DESCRIPTORS: CRAMPING;ACHING
DESCRIPTORS: ACHING

## 2024-05-31 ASSESSMENT — PAIN DESCRIPTION - LOCATION
LOCATION: ABDOMEN;BACK
LOCATION: ABDOMEN
LOCATION: HEAD

## 2024-05-31 NOTE — ANESTHESIA PROCEDURE NOTES
Epidural Block    Patient location during procedure: OB  Start time: 5/31/2024 12:23 PM  End time: 5/31/2024 12:43 PM  Reason for block: labor epidural  Staffing  Performed: anesthesiologist   Anesthesiologist: Osvaldo Moser MD  Performed by: Osvaldo Moser MD  Authorized by: Osvaldo Moser MD    Epidural  Patient position: sitting  Prep: ChloraPrep  Patient monitoring: continuous pulse ox and frequent blood pressure checks  Approach: midline  Location: L3-4  Injection technique: PAL air  Provider prep: mask and sterile gloves  Needle  Needle type: Tuohy   Needle gauge: 17 G  Needle length: 3.5 in  Needle insertion depth: 5 cm  Catheter type: end hole  Catheter size: 19 G  Catheter at skin depth: 11 cm  Test dose: negative (Negative test dose with 5 mL 1.5% lidocaine with 1:200,000 epinephrine at  1227)Catheter Secured: tegaderm and tape  Assessment  Sensory level: T10  Hemodynamics: stable  Attempts: 1  Outcomes: uncomplicated  Additional Notes  Procedure Time Out at 1227  Preanesthetic Checklist  Completed: patient identified, IV checked, risks and benefits discussed, surgical/procedural consents, equipment checked, pre-op evaluation, timeout performed, anesthesia consent given, oxygen available and monitors applied/VS acknowledged

## 2024-05-31 NOTE — PLAN OF CARE
Problem: Vaginal Birth or  Section  Goal: Fetal and maternal status remain reassuring during the birth process  Description:  Birth OB-Pregnancy care plan goal which identifies if the fetal and maternal status remain reassuring during the birth process  Outcome: Progressing     Problem: Postpartum  Goal: Experiences normal postpartum course  Description:  Postpartum OB-Pregnancy care plan goal which identifies if the mother is experiencing a normal postpartum course  Outcome: Progressing     Problem: Pain  Goal: Verbalizes/displays adequate comfort level or baseline comfort level  Outcome: Progressing     Problem: Safety - Adult  Goal: Free from fall injury  Outcome: Progressing     Problem: Infection - Adult  Goal: Absence of infection at discharge  Outcome: Progressing

## 2024-05-31 NOTE — ANESTHESIA PRE PROCEDURE
including opioid type drug, continuous use ([facility]) F11.20, F19.20    Short interval between pregnancies affecting pregnancy in third trimester, antepartum O09.893    Marijuana use during pregnancy O99.320, F12.90    Anemia during pregnancy in third trimester O99.013    Oligohydramnios antepartum, third trimester, not applicable or unspecified fetus O41.03X0       Past Medical History:        Diagnosis Date    Abnormal Pap smear of cervix     states abn last yr-pt unsure of what trmt she had    ADD (attention deficit disorder)     Anxiety     Depression     Sciatic pain     Trauma        Past Surgical History:        Procedure Laterality Date    ORTHOPEDIC SURGERY      3 on right leg , 1 left leg       Social History:    Social History     Tobacco Use    Smoking status: Former     Current packs/day: 0.00     Types: Cigarettes     Quit date: 2019     Years since quittin.4    Smokeless tobacco: Never   Substance Use Topics    Alcohol use: Not Currently                                Counseling given: Not Answered      Vital Signs (Current):   Vitals:    24 1245 24 1246 24 1248 24 1251   BP: 121/67 122/65 (!) 112/59 (!) 114/56   Pulse: 88 88 90 94   Resp:    16   Temp:    98.2 °F (36.8 °C)   TempSrc:    Oral                                              BP Readings from Last 3 Encounters:   24 (!) 114/56   24 130/80   24 127/69       NPO Status:                                                                                 BMI:   Wt Readings from Last 3 Encounters:   24 94.9 kg (209 lb 4.8 oz)   24 95.3 kg (210 lb 1.6 oz)   05/10/24 92.1 kg (203 lb)     There is no height or weight on file to calculate BMI.    CBC:   Lab Results   Component Value Date/Time    WBC 5.7 2024 10:08 AM    RBC 3.57 2024 10:08 AM    HGB 11.0 2024 10:08 AM    HCT 32.8 2024 10:08 AM    MCV 91.9 2024 10:08 AM    RDW 15.1 2024 10:08 AM     2024

## 2024-05-31 NOTE — L&D DELIVERY NOTE
Living Status: Living        Skin Color:   Heart Rate:   Reflex Irritability:   Muscle Tone:   Respiratory Effort:   Total:            1 Minute:    1    2    2    2    2    9         5 Minute:    1    2    2    2    2    9                                        Apgars Assigned By: ROCAEL              Resuscitation    Method: Bulb Suction, Stimulation             Letart Measurements                    Head of the infant delivered over an intact perineum, oropharynx and nares were bulb suctioned.  The remainder of the infant delivered without difficulty.  The cord was cross clamped and divided and the infant handed to awaiting personnel.  Cord blood was then obtained for pH and  studies.  The placenta then delivered spontaneously, intact with firm fundus and minimal lochia appreciated.    Vulva, vagina and cervix were inspected with no lacerations requiring repair identified.

## 2024-05-31 NOTE — H&P
History & Physical    Name: Lauren Moctezuma MRN: [id number]  SSN: [id number]    YOB: 1990  Age: 34 y.o.  Sex: female      Subjective:     Estimated Date of Delivery: 6/10/24  OB History    Para Term  AB Living   3 2 2     2   SAB IAB Ectopic Molar Multiple Live Births             2      # Outcome Date GA Lbr Juan/2nd Weight Sex Delivery Anes PTL Lv   3 Current            2 Term 23 39w4d / 00:21 3.681 kg (8 lb 1.8 oz) M Vag-Spont EPI N MIKE   1 Term 14 39w0d  3.118 kg (6 lb 14 oz) F Vag-Spont   MIKE       Ms. Moctezuma is admitted with pregnancy at 38w4d for induction of labor due to hydramnios. Prenatal course was complicated by  history of substance abuse .  Please see prenatal records for details.    Past Medical History:   Diagnosis Date    Abnormal Pap smear of cervix     states abn last yr-pt unsure of what trmt she had    ADD (attention deficit disorder)     Anxiety     Depression     Sciatic pain     Trauma      Past Surgical History:   Procedure Laterality Date    ORTHOPEDIC SURGERY      3 on right leg , 1 left leg     Social History     Occupational History    Not on file   Tobacco Use    Smoking status: Former     Current packs/day: 0.00     Types: Cigarettes     Quit date: 2019     Years since quittin.4    Smokeless tobacco: Never   Vaping Use    Vaping Use: Every day    Substances: Nicotine   Substance and Sexual Activity    Alcohol use: Not Currently    Drug use: Never    Sexual activity: Yes     Partners: Male     Birth control/protection: None     Family History   Problem Relation Age of Onset    Hypertension Mother     Mental Illness Mother     Hypertension Father     Colon Cancer Father     Mental Illness Father     Diabetes Maternal Grandmother     Heart Surgery Maternal Grandmother     Diabetes Maternal Grandfather        No Known Allergies  Prior to Admission medications    Medication Sig Start Date End Date Taking? Authorizing Provider

## 2024-06-01 LAB
HGB BLD-MCNC: 10.9 G/DL (ref 11.7–15.4)
RPR SER QL: NONREACTIVE

## 2024-06-01 PROCEDURE — 6370000000 HC RX 637 (ALT 250 FOR IP): Performed by: OBSTETRICS & GYNECOLOGY

## 2024-06-01 PROCEDURE — 36415 COLL VENOUS BLD VENIPUNCTURE: CPT

## 2024-06-01 PROCEDURE — 1100000000 HC RM PRIVATE

## 2024-06-01 PROCEDURE — 85018 HEMOGLOBIN: CPT

## 2024-06-01 RX ORDER — FLUOXETINE HYDROCHLORIDE 20 MG/1
20 CAPSULE ORAL NIGHTLY
Status: DISCONTINUED | OUTPATIENT
Start: 2024-06-01 | End: 2024-06-01

## 2024-06-01 RX ORDER — CLONAZEPAM 1 MG/1
1 TABLET ORAL EVERY 12 HOURS PRN
Status: DISCONTINUED | OUTPATIENT
Start: 2024-06-01 | End: 2024-06-02 | Stop reason: HOSPADM

## 2024-06-01 RX ORDER — FAMOTIDINE 20 MG/1
20 TABLET, FILM COATED ORAL DAILY
Status: DISCONTINUED | OUTPATIENT
Start: 2024-06-02 | End: 2024-06-02 | Stop reason: HOSPADM

## 2024-06-01 RX ORDER — FLUOXETINE 10 MG/1
10 CAPSULE ORAL ONCE
Status: COMPLETED | OUTPATIENT
Start: 2024-06-01 | End: 2024-06-01

## 2024-06-01 RX ADMIN — DOCUSATE SODIUM 100 MG: 100 CAPSULE, LIQUID FILLED ORAL at 08:39

## 2024-06-01 RX ADMIN — FLUOXETINE 10 MG: 10 CAPSULE ORAL at 21:48

## 2024-06-01 RX ADMIN — GABAPENTIN 600 MG: 300 CAPSULE ORAL at 21:16

## 2024-06-01 RX ADMIN — OXYCODONE 10 MG: 5 TABLET ORAL at 21:15

## 2024-06-01 RX ADMIN — ACETAMINOPHEN 1000 MG: 500 TABLET, FILM COATED ORAL at 12:05

## 2024-06-01 RX ADMIN — CLONAZEPAM 0.5 MG: 1 TABLET ORAL at 09:23

## 2024-06-01 RX ADMIN — GABAPENTIN 600 MG: 300 CAPSULE ORAL at 13:39

## 2024-06-01 RX ADMIN — IBUPROFEN 800 MG: 800 TABLET ORAL at 00:42

## 2024-06-01 RX ADMIN — IBUPROFEN 800 MG: 800 TABLET ORAL at 15:59

## 2024-06-01 RX ADMIN — IBUPROFEN 800 MG: 800 TABLET ORAL at 08:39

## 2024-06-01 RX ADMIN — ACETAMINOPHEN 1000 MG: 500 TABLET, FILM COATED ORAL at 04:56

## 2024-06-01 RX ADMIN — OXYCODONE 10 MG: 5 TABLET ORAL at 13:39

## 2024-06-01 RX ADMIN — ACETAMINOPHEN 1000 MG: 500 TABLET, FILM COATED ORAL at 21:15

## 2024-06-01 RX ADMIN — DOCUSATE SODIUM 100 MG: 100 CAPSULE, LIQUID FILLED ORAL at 21:15

## 2024-06-01 RX ADMIN — OXYCODONE 5 MG: 5 TABLET ORAL at 04:58

## 2024-06-01 RX ADMIN — FLUOXETINE HYDROCHLORIDE 20 MG: 20 CAPSULE ORAL at 21:16

## 2024-06-01 RX ADMIN — GABAPENTIN 600 MG: 300 CAPSULE ORAL at 08:40

## 2024-06-01 RX ADMIN — OXYCODONE 10 MG: 5 TABLET ORAL at 09:26

## 2024-06-01 RX ADMIN — CLONAZEPAM 0.5 MG: 1 TABLET ORAL at 15:59

## 2024-06-01 RX ADMIN — FAMOTIDINE 20 MG: 20 TABLET, FILM COATED ORAL at 08:40

## 2024-06-01 ASSESSMENT — PAIN DESCRIPTION - DESCRIPTORS: DESCRIPTORS: SORE;CRAMPING

## 2024-06-01 ASSESSMENT — PAIN SCALES - GENERAL: PAINLEVEL_OUTOF10: 8

## 2024-06-01 ASSESSMENT — PAIN DESCRIPTION - ORIENTATION: ORIENTATION: LOWER

## 2024-06-01 ASSESSMENT — PAIN DESCRIPTION - LOCATION: LOCATION: ABDOMEN;PERINEUM

## 2024-06-01 NOTE — ANESTHESIA POSTPROCEDURE EVALUATION
Department of Anesthesiology  Postprocedure Note    Patient: Lauren Moctezuma  MRN: [id number]  YOB: 1990  Date of evaluation: 5/31/2024    Procedure Summary       Date: 05/31/24 Room / Location:     Anesthesia Start: 1223 Anesthesia Stop: 1803    Procedure: Labor Analgesia Diagnosis:     Scheduled Providers:  Responsible Provider: Osvaldo Moser MD    Anesthesia Type: epidural ASA Status: 2            Anesthesia Type: No value filed.    Benjamin Phase I:      Benjamin Phase II:      Anesthesia Post Evaluation    Patient location during evaluation: PACU  Patient participation: complete - patient participated  Level of consciousness: awake and alert  Airway patency: patent  Nausea & Vomiting: no nausea and no vomiting  Cardiovascular status: hemodynamically stable  Respiratory status: acceptable, nonlabored ventilation and spontaneous ventilation  Hydration status: euvolemic  Comments: /88   Pulse 69   Temp 98.1 °F (36.7 °C) (Axillary) Comment (Src): patient drinking ice water  Resp 19   LMP 08/04/2023 (Approximate)   SpO2 98%   Breastfeeding Unknown     The patient was satisfied with her labor epidural and denies any complications.  Her lower extremities have returned to baseline neurologically.  Multimodal analgesia pain management approach  Pain management: adequate and satisfactory to patient        No notable events documented.

## 2024-06-02 VITALS
RESPIRATION RATE: 18 BRPM | DIASTOLIC BLOOD PRESSURE: 75 MMHG | HEART RATE: 69 BPM | OXYGEN SATURATION: 97 % | SYSTOLIC BLOOD PRESSURE: 118 MMHG | TEMPERATURE: 97.8 F

## 2024-06-02 PROCEDURE — 6370000000 HC RX 637 (ALT 250 FOR IP): Performed by: OBSTETRICS & GYNECOLOGY

## 2024-06-02 RX ORDER — IBUPROFEN 800 MG/1
800 TABLET ORAL EVERY 8 HOURS PRN
Qty: 30 TABLET | Refills: 0 | Status: SHIPPED | OUTPATIENT
Start: 2024-06-02

## 2024-06-02 RX ADMIN — OXYCODONE 10 MG: 5 TABLET ORAL at 04:33

## 2024-06-02 RX ADMIN — DOCUSATE SODIUM 100 MG: 100 CAPSULE, LIQUID FILLED ORAL at 09:18

## 2024-06-02 RX ADMIN — IBUPROFEN 800 MG: 800 TABLET ORAL at 12:24

## 2024-06-02 RX ADMIN — GABAPENTIN 600 MG: 300 CAPSULE ORAL at 12:24

## 2024-06-02 RX ADMIN — CLONAZEPAM 1 MG: 1 TABLET ORAL at 04:33

## 2024-06-02 RX ADMIN — GABAPENTIN 600 MG: 300 CAPSULE ORAL at 04:42

## 2024-06-02 RX ADMIN — ACETAMINOPHEN 1000 MG: 500 TABLET, FILM COATED ORAL at 09:17

## 2024-06-02 RX ADMIN — IBUPROFEN 800 MG: 800 TABLET ORAL at 04:42

## 2024-06-02 RX ADMIN — FAMOTIDINE 20 MG: 20 TABLET, FILM COATED ORAL at 09:18

## 2024-06-02 RX ADMIN — OXYCODONE 10 MG: 5 TABLET ORAL at 09:18

## 2024-06-02 NOTE — PROGRESS NOTES
Post-Partum Day Number 1 Progress Note    Patient doing well post-partum without significant complaint.  Voiding without difficulty, normal lochia.    Vitals:  Patient Vitals for the past 8 hrs:   BP Temp Temp src Pulse Resp SpO2   24 0456 -- -- -- -- 18 --   24 0316 127/75 98.1 °F (36.7 °C) Oral 67 18 95 %   24 2318 126/82 97.7 °F (36.5 °C) Oral 59 18 100 %   24 2312 -- -- -- -- 18 --   24 2155 125/75 97.7 °F (36.5 °C) Oral 84 19 99 %     Temp (24hrs), Av.1 °F (36.7 °C), Min:97.7 °F (36.5 °C), Max:98.4 °F (36.9 °C)      Vital signs stable, afebrile.    Exam:  Patient without distress.               Abdomen soft, fundus firm at level of umbilicus, nontender               Perineum with normal lochia noted.               Lower extremities are negative for swelling, cords or tenderness.    Lab/Data Review:  Lab Results   Component Value Date    WBC 5.7 2024    HGB 11.0 (L) 2024    HCT 32.8 (L) 2024    MCV 91.9 2024     2024        Assessment and Plan:  Patient appears to be having uncomplicated post-partum course.  Continue routine perineal care and maternal education.  Plan discharge tomorrow if no problems occur.                 
   06/02/24 1227   AVS Reviewed   AVS & discharge instructions reviewed with patient and/or representative? Yes   Reviewed instructions with Patient   Level of Understanding Questions answered;Verbalized understanding       
  Dr Pacheco called for delivery Room set up  
 of viable baby girl by Dr Pacheco.   
Admission assessment complete as noted. Patient oriented to room and unit. Plan of care reviewed and patient verbalizes understanding. Questions encouraged and answered. Patent encouraged to call for needs or concerns.   Safety Teaching reviewed:   Hand hygiene prior to handling the infant.  Use of bulb syringe.  Bracelets with matching numbers are placed on mother and infant  An infant security tag  (Hugs) is placed on the infant's ankle and monitored  All OB nurses wear pink Employee badges - do not give your baby to anyone without proper identification.   Never leave the baby alone in the room.  The infant should be placed on their back to sleep.on a firm mattress. No toys should be placed in the crib. (safe sleep video offered to view)  Never shake the baby (video offered to view)  Infant fall prevention - do not sleep with the baby, and place the baby in the crib while ambulating.   Mother and Baby Care booklet given to Mother.   
Delivery of intact placenta by Dr Pacheco. 30units pitocin infusing at 909 at ordered  
Dr Pacheco at bedside. AROM clear. 4cm. Lr bolus started for epidural  
EPIDURAL PLACEMENT      Dr Moser at bedside at 1225.  JAKOB Mac at bedside at 1223    Assisted pt to sitting up on bedside at 1225.    Timeout completed at 1227 with JAKOB VAUGHAN and myself at bedside.    Test dose given at 1237.  Negative reaction.    Dose given at 1244.    Pt assisted to lying back in left tilt position.     See anesthesia record for details.  See vital sign flow sheet for BP.    Tolerated procedure well.   
Epidural in place. Pt tolerated well. Pt instructed not to get out of bed. Pt verbalized understanding  
In room to given pt he requested Klonopin, per Pt she states she takes Klonopin 1mg PO BID and is asking to have her order changed. Call placed to Dr. Pacheco, informed him of the above, per MD pt can have a order for klonopin 1mg PO BID prn, order read back and verified. Order placed.   
Patient discharged to home per MD orders.  Discharge instructions reviewed with patient and pt given a copy. Questions encouraged and answered. Patient verbalizes understanding. Patient going to [facility] to stay with baby. Stable at discharge.    
Pt instructed to call for nurse before trying to get out of bed. Informed pt that she may not be able to stand even if she can move her legs in the bed. Pt verbalized understanding.  
Shift assessment complete see flowsheet. Discussed today plan of  care with pt.bleeding precautions given. No s/s  of distress noted  at this time. Questions encouraged and answered. Pt requesting her Adderall XR 15mg BID, will call MD  and pt also states she takes her Prozac qhs. Pt to call with needs/concerns. Pt in bed  with call light in reach.     2209-Dr. Pacheco called regarding the above medications, per MD Adderall is out of stock and pt can't take her own. Also per MD can change  the Prozac 20mg to 1 po qhs. Order read back and verified.   
Shift assessment complete see flowsheet. Discussed today plan of care with pt. Bleeding precautions given. No s/s of distress noted at this time. Questions encouraged and answered. Pt to call with needs/concerns. Pt in bed with call light in reach.   
Pt received sitting upright OOB in chair, alert and oriented x3, no c/o pain. speech and language abilities at baseline, per pt. +room air.

## 2024-06-02 NOTE — DISCHARGE SUMMARY
Post-Partum Day Number 2 Progress/Discharge Note    Patient doing well post-partum without significant complaint.  Voiding without difficulty, normal lochia, positive flatus.    Vitals:  No data found.  Temp (24hrs), Av.9 °F (36.6 °C), Min:97.9 °F (36.6 °C), Max:97.9 °F (36.6 °C)      Vital signs stable, afebrile.    Exam:  Patient without distress.               Abdomen soft, fundus firm at level of umbilicus, non tender               Perineum with normal lochia noted.               Lower extremities are negative for swelling, cords or tenderness.    Lab/Data Review:  Lab Results   Component Value Date    WBC 5.7 2024    HGB 10.9 (L) 2024    HCT 32.8 (L) 2024    MCV 91.9 2024     2024         Assessment and Plan:  Patient appears to be having uncomplicated post-partum course.  Continue routine perineal care and maternal education.  Plan discharge for today with follow up in our office in 2 weeks.

## 2024-06-03 NOTE — ADT AUTH CERT
Progress Notes by Ky Pacheco MD at 2024  5:47 AM    Author: Ky Pacheco MD Service: Obstetrics & Gynecology Author Type: Physician   Filed: 2024  5:48 AM Date of Service: 2024  5:47 AM Status: Signed   : Ky Pacheco MD (Physician)                               Post-Partum Day Number 1 Progress Note     Patient doing well post-partum without significant complaint.  Voiding without difficulty, normal lochia.     Vitals:  Patient Vitals for the past 8 hrs:    BP Temp Temp src Pulse Resp SpO2   24 0456 -- -- -- -- 18 --   24 0316 127/75 98.1 °F (36.7 °C) Oral 67 18 95 %   24 2318 126/82 97.7 °F (36.5 °C) Oral 59 18 100 %   24 2312 -- -- -- -- 18 --   24 2155 125/75 97.7 °F (36.5 °C) Oral 84 19 99 %     Temp (24hrs), Av.1 °F (36.7 °C), Min:97.7 °F (36.5 °C), Max:98.4 °F (36.9 °C)        Vital signs stable, afebrile.     Exam:  Patient without distress.               Abdomen soft, fundus firm at level of umbilicus, nontender               Perineum with normal lochia noted.               Lower extremities are negative for swelling, cords or tenderness.     Lab/Data Review:        Lab Results   Component Value Date     WBC 5.7 2024     HGB 11.0 (L) 2024     HCT 32.8 (L) 2024     MCV 91.9 2024      2024         Assessment and Plan:  Patient appears to be having uncomplicated post-partum course.  Continue routine perineal care and maternal education.  Plan discharge tomorrow if no problems occur.                     Electronically signed by Ky Pacheco MD at 2024  5:48 AM

## 2024-12-16 ENCOUNTER — OFFICE VISIT (OUTPATIENT)
Dept: OBGYN CLINIC | Age: 34
End: 2024-12-16
Payer: MEDICAID

## 2024-12-16 ENCOUNTER — PROCEDURE VISIT (OUTPATIENT)
Dept: OBGYN CLINIC | Age: 34
End: 2024-12-16
Payer: MEDICAID

## 2024-12-16 VITALS
SYSTOLIC BLOOD PRESSURE: 110 MMHG | WEIGHT: 175 LBS | BODY MASS INDEX: 28.12 KG/M2 | DIASTOLIC BLOOD PRESSURE: 72 MMHG | HEIGHT: 66 IN

## 2024-12-16 DIAGNOSIS — N92.6 MISSED MENSES: Primary | ICD-10-CM

## 2024-12-16 DIAGNOSIS — O09.891 MEDICATION EXPOSURE DURING FIRST TRIMESTER OF PREGNANCY: ICD-10-CM

## 2024-12-16 DIAGNOSIS — Z32.00 ENCOUNTER FOR PREGNANCY TEST, RESULT UNKNOWN: ICD-10-CM

## 2024-12-16 DIAGNOSIS — F98.8 ATTENTION DEFICIT DISORDER, UNSPECIFIED TYPE: ICD-10-CM

## 2024-12-16 DIAGNOSIS — O99.340 MATERNAL MENTAL DISORDER, ANTEPARTUM: ICD-10-CM

## 2024-12-16 DIAGNOSIS — F11.20 POLYSUBSTANCE DEPENDENCE INCLUDING OPIOID TYPE DRUG, CONTINUOUS USE (HCC): ICD-10-CM

## 2024-12-16 DIAGNOSIS — F19.20 POLYSUBSTANCE DEPENDENCE INCLUDING OPIOID TYPE DRUG, CONTINUOUS USE (HCC): ICD-10-CM

## 2024-12-16 LAB
AMPHET UR QL SCN: POSITIVE
BARBITURATES UR QL SCN: NEGATIVE
BENZODIAZ UR QL: POSITIVE
CANNABINOIDS UR QL SCN: POSITIVE
COCAINE UR QL SCN: NEGATIVE
HCG, PREGNANCY, URINE, POC: POSITIVE
METHADONE UR QL: NEGATIVE
OPIATES UR QL: NEGATIVE
PCP UR QL: NEGATIVE
VALID INTERNAL CONTROL, POC: YES

## 2024-12-16 PROCEDURE — 76857 US EXAM PELVIC LIMITED: CPT | Performed by: STUDENT IN AN ORGANIZED HEALTH CARE EDUCATION/TRAINING PROGRAM

## 2024-12-16 PROCEDURE — 81025 URINE PREGNANCY TEST: CPT | Performed by: NURSE PRACTITIONER

## 2024-12-16 PROCEDURE — 99214 OFFICE O/P EST MOD 30 MIN: CPT | Performed by: NURSE PRACTITIONER

## 2024-12-16 RX ORDER — ONDANSETRON 8 MG/1
8 TABLET, FILM COATED ORAL EVERY 8 HOURS PRN
Qty: 20 TABLET | Refills: 2 | Status: SHIPPED | OUTPATIENT
Start: 2024-12-16

## 2024-12-16 RX ORDER — OXYCODONE HYDROCHLORIDE 5 MG/1
5 TABLET ORAL
COMMUNITY

## 2024-12-16 RX ORDER — OXYCODONE HYDROCHLORIDE 10 MG/1
10 TABLET ORAL
COMMUNITY
Start: 2024-06-21

## 2024-12-16 RX ORDER — HYDROCODONE BITARTRATE AND ACETAMINOPHEN 10; 325 MG/1; MG/1
TABLET ORAL
COMMUNITY
Start: 2024-11-19

## 2024-12-16 RX ORDER — DEXTROAMPHETAMINE SACCHARATE, AMPHETAMINE ASPARTATE, DEXTROAMPHETAMINE SULFATE AND AMPHETAMINE SULFATE 5; 5; 5; 5 MG/1; MG/1; MG/1; MG/1
20 TABLET ORAL DAILY
COMMUNITY
Start: 2024-12-09

## 2024-12-16 RX ORDER — GABAPENTIN 800 MG/1
800 TABLET ORAL
COMMUNITY
Start: 2024-11-19

## 2024-12-16 RX ORDER — CLONAZEPAM 2 MG/1
2 TABLET ORAL
COMMUNITY
Start: 2024-12-09

## 2024-12-17 ENCOUNTER — TELEPHONE (OUTPATIENT)
Dept: OBGYN CLINIC | Age: 34
End: 2024-12-17

## 2024-12-17 NOTE — TELEPHONE ENCOUNTER
Add on lab form sent for specification on amphetamine (lab delmer [id number]) and urine cannabinoid confirmation.

## 2024-12-17 NOTE — PROGRESS NOTES
Lab could not add on testing not enough urine.  Will need to collect/order at next visit.  
klonopin.   She had taken these previously with her last pregnancy.   Lengthy disc with pt on fetal medication exposure and possible risks.  rec d/c Klonopin rationale reviewed  Will refer MFM and check UDS today.    Rx zofran 8mg    Reviewed hx and Poc with dr beth who agrees with above.     Orders Placed This Encounter   Procedures    Urine Drug Screen     Standing Status:   Future     Number of Occurrences:   1     Standing Expiration Date:   12/16/2025    [facility] - Laura Medeiros MD, Maternal & Fetal Medicine, [place]     Referral Priority:   Routine     Referral Type:   Eval and Treat     Referral Reason:   Specialty Services Required     Referred to Provider:   Laura Medeiros MD     Requested Specialty:   Maternal & Fetal Medicine     Number of Visits Requested:   1    AMB POC URINE PREGNANCY TEST, VISUAL COLOR COMPARISON       Supervising physician is Dr. Beth.  30 min chart review, counseling and documentation

## 2024-12-17 NOTE — TELEPHONE ENCOUNTER
----- Message from SASHA Estrada - CNP sent at 12/17/2024  8:04 AM EST -----  Ob pt seen for conf appt  Taking klonopin and adderall  Need thc titer and I think panel to identify specific amphetamine??

## 2025-01-08 NOTE — PROGRESS NOTES
Chief Complaint:  This 34 y.o. female presents today for an initial obstetrical examination.  She has no complaints.  Well known to [facility] and our practice from recent similarly complicated pregnancy.  MFM appt next month for polysubstance use.  Implications of USG finding speculated about, with patient and FOB.  Due date calculated from 12 week USG consistent with EDC of 2025, unchanged by today's USG.    US Findings from Today (2025):  GROWTH/VIABILITY- SIP, SUBUSTANCE ABUSE/DRUG EXPOSURE   FHT= 154   EFW=8.5%   AC=36%   GOOD FETAL MOVEMENT   CX- APPEARS WNL   PLACENTA CONTAINS BULGING, COMPLEX LAKE 3.6 X 3.6 X 3.2CM     Last pap:  NML pap, Positive HPV        LMP:  Patient's last menstrual period was 2024.    EDC:  Estimated Date of Delivery: 25    OB History:    OB History    Para Term  AB Living   4 3 3     3   SAB IAB Ectopic Molar Multiple Live Births           0 3      # Outcome Date GA Lbr Juan/2nd Weight Sex Type Anes PTL Lv   4 Current            3 Term 24 38w4d  3.54 kg (7 lb 12.9 oz) M Vag-Spont EPI N MIKE   2 Term 23 39w4d / 00:21 3.681 kg (8 lb 1.8 oz) M Vag-Spont EPI N MIKE   1 Term 14 39w0d  3.118 kg (6 lb 14 oz) F Vag-Spont   MIKE      Allergies:  No Known Allergies    Medication History:  Current Outpatient Medications   Medication Sig Dispense Refill    oxyCODONE (OXY-IR) 15 MG immediate release tablet Take 1 tablet by mouth every 4 hours as needed for Pain.      loratadine (CLARITIN) 10 MG capsule Take 1 capsule by mouth daily      amphetamine-dextroamphetamine (ADDERALL) 20 MG tablet Take 1 tablet by mouth daily.      clonazePAM (KLONOPIN) 2 MG tablet Take 1 tablet by mouth.      gabapentin (NEURONTIN) 800 MG tablet Take 1 tablet by mouth.      ondansetron (ZOFRAN) 8 MG tablet Take 1 tablet by mouth every 8 hours as needed for Nausea or Vomiting 20 tablet 2    FLUoxetine (PROZAC) 10 MG capsule Take 1 capsule by mouth daily      FLUoxetine

## 2025-01-09 ENCOUNTER — PROCEDURE VISIT (OUTPATIENT)
Dept: OBGYN CLINIC | Age: 35
End: 2025-01-09
Payer: MEDICAID

## 2025-01-09 ENCOUNTER — ROUTINE PRENATAL (OUTPATIENT)
Dept: OBGYN CLINIC | Age: 35
End: 2025-01-09
Payer: MEDICAID

## 2025-01-09 ENCOUNTER — INITIAL PRENATAL (OUTPATIENT)
Dept: OBGYN CLINIC | Age: 35
End: 2025-01-09

## 2025-01-09 VITALS
SYSTOLIC BLOOD PRESSURE: 110 MMHG | DIASTOLIC BLOOD PRESSURE: 76 MMHG | BODY MASS INDEX: 26.71 KG/M2 | HEIGHT: 66 IN | WEIGHT: 166.2 LBS

## 2025-01-09 DIAGNOSIS — F19.20 POLYSUBSTANCE DEPENDENCE INCLUDING OPIOID TYPE DRUG, CONTINUOUS USE (HCC): ICD-10-CM

## 2025-01-09 DIAGNOSIS — Z12.4 SCREENING FOR CERVICAL CANCER: ICD-10-CM

## 2025-01-09 DIAGNOSIS — Z3A.18 18 WEEKS GESTATION OF PREGNANCY: ICD-10-CM

## 2025-01-09 DIAGNOSIS — O09.92 HIGH-RISK PREGNANCY IN SECOND TRIMESTER: Primary | ICD-10-CM

## 2025-01-09 DIAGNOSIS — Z34.92 PRENATAL CARE, SECOND TRIMESTER: Primary | ICD-10-CM

## 2025-01-09 DIAGNOSIS — F11.20 POLYSUBSTANCE DEPENDENCE INCLUDING OPIOID TYPE DRUG, CONTINUOUS USE (HCC): ICD-10-CM

## 2025-01-09 DIAGNOSIS — O09.891 MEDICATION EXPOSURE DURING FIRST TRIMESTER OF PREGNANCY: ICD-10-CM

## 2025-01-09 DIAGNOSIS — Z13.89 SCREENING FOR GENITOURINARY CONDITION: ICD-10-CM

## 2025-01-09 DIAGNOSIS — Z11.51 SCREENING FOR HUMAN PAPILLOMAVIRUS (HPV): ICD-10-CM

## 2025-01-09 DIAGNOSIS — O09.893 SHORT INTERVAL BETWEEN PREGNANCIES AFFECTING PREGNANCY IN THIRD TRIMESTER, ANTEPARTUM: ICD-10-CM

## 2025-01-09 DIAGNOSIS — O99.320 MARIJUANA USE DURING PREGNANCY: ICD-10-CM

## 2025-01-09 DIAGNOSIS — F19.11 HISTORY OF DRUG ABUSE (HCC): ICD-10-CM

## 2025-01-09 DIAGNOSIS — F11.20 POLYSUBSTANCE DEPENDENCE INCLUDING OPIOID TYPE DRUG, CONTINUOUS USE (HCC): Primary | ICD-10-CM

## 2025-01-09 DIAGNOSIS — Z11.3 SCREENING EXAMINATION FOR STD (SEXUALLY TRANSMITTED DISEASE): ICD-10-CM

## 2025-01-09 DIAGNOSIS — Z3A.15 15 WEEKS GESTATION OF PREGNANCY: ICD-10-CM

## 2025-01-09 DIAGNOSIS — F12.90 MARIJUANA USE DURING PREGNANCY: ICD-10-CM

## 2025-01-09 DIAGNOSIS — O09.92 HIGH-RISK PREGNANCY IN SECOND TRIMESTER: ICD-10-CM

## 2025-01-09 DIAGNOSIS — F19.20 POLYSUBSTANCE DEPENDENCE INCLUDING OPIOID TYPE DRUG, CONTINUOUS USE (HCC): Primary | ICD-10-CM

## 2025-01-09 DIAGNOSIS — Z34.02 ENCOUNTER FOR SUPERVISION OF NORMAL FIRST PREGNANCY IN SECOND TRIMESTER: ICD-10-CM

## 2025-01-09 DIAGNOSIS — O99.331: ICD-10-CM

## 2025-01-09 PROBLEM — O99.013 ANEMIA DURING PREGNANCY IN THIRD TRIMESTER: Status: RESOLVED | Noted: 2024-04-16 | Resolved: 2025-01-09

## 2025-01-09 PROBLEM — O09.93 HIGH-RISK PREGNANCY IN THIRD TRIMESTER: Status: RESOLVED | Noted: 2023-11-09 | Resolved: 2025-01-09

## 2025-01-09 PROBLEM — M54.9 BACK PAIN COMPLICATING PREGNANCY IN THIRD TRIMESTER: Status: RESOLVED | Noted: 2023-11-09 | Resolved: 2025-01-09

## 2025-01-09 PROBLEM — R76.8 FALSE POSITIVE SEROLOGICAL TEST FOR HEPATITIS C: Status: RESOLVED | Noted: 2023-11-14 | Resolved: 2025-01-09

## 2025-01-09 PROBLEM — O09.90 HIGH-RISK PREGNANCY: Status: ACTIVE | Noted: 2025-01-09

## 2025-01-09 PROBLEM — O99.213 OBESITY AFFECTING PREGNANCY IN THIRD TRIMESTER: Status: RESOLVED | Noted: 2023-12-28 | Resolved: 2025-01-09

## 2025-01-09 PROBLEM — O99.891 BACK PAIN COMPLICATING PREGNANCY IN THIRD TRIMESTER: Status: RESOLVED | Noted: 2023-11-09 | Resolved: 2025-01-09

## 2025-01-09 PROBLEM — O41.03X0 OLIGOHYDRAMNIOS ANTEPARTUM, THIRD TRIMESTER, NOT APPLICABLE OR UNSPECIFIED FETUS: Status: RESOLVED | Noted: 2024-05-31 | Resolved: 2025-01-09

## 2025-01-09 LAB
AMPHET UR QL SCN: POSITIVE
BARBITURATES UR QL SCN: POSITIVE
BENZODIAZ UR QL: POSITIVE
CANNABINOIDS UR QL SCN: POSITIVE
COCAINE UR QL SCN: NEGATIVE
GLUCOSE URINE, POC: NEGATIVE
METHADONE UR QL: NEGATIVE
OPIATES UR QL: NEGATIVE
PCP UR QL: NEGATIVE
PROTEIN,URINE, POC: NORMAL

## 2025-01-09 PROCEDURE — 76816 OB US FOLLOW-UP PER FETUS: CPT | Performed by: OBSTETRICS & GYNECOLOGY

## 2025-01-09 PROCEDURE — 81002 URINALYSIS NONAUTO W/O SCOPE: CPT | Performed by: OBSTETRICS & GYNECOLOGY

## 2025-01-09 PROCEDURE — 99214 OFFICE O/P EST MOD 30 MIN: CPT | Performed by: OBSTETRICS & GYNECOLOGY

## 2025-01-09 RX ORDER — OXYCODONE HYDROCHLORIDE 15 MG/1
15 TABLET ORAL EVERY 4 HOURS PRN
COMMUNITY

## 2025-01-09 RX ORDER — LORATADINE 10 MG/1
10 CAPSULE, LIQUID FILLED ORAL DAILY
COMMUNITY

## 2025-01-09 NOTE — PROGRESS NOTES
Lauren PerdomontyreG4P3 presents today for nob talk.  EDC 25 based off of US.  Patient is currently 15 days 1 weeks pregnant.    Patient education was discussed in depth and OB book reviewed with patient.  [facility] policies reviewed with patient.    Genetic testing discussed in depth with patient.  Patient elects NIPT.    Past Medical History:see problem list.    Patient c/o:none    Patient to see Dr Junior morrison for nob exam.    All questions answered patient voiced full understanding, consents signed.  Patient encouraged to call with questions or concerns.

## 2025-01-11 LAB
BACTERIA SPEC CULT: ABNORMAL
SERVICE CMNT-IMP: ABNORMAL

## 2025-01-14 ENCOUNTER — TELEPHONE (OUTPATIENT)
Dept: OBGYN CLINIC | Age: 35
End: 2025-01-14

## 2025-01-14 DIAGNOSIS — R82.71 GBS BACTERIURIA: Primary | ICD-10-CM

## 2025-01-14 DIAGNOSIS — F12.90 MARIJUANA USE DURING PREGNANCY: ICD-10-CM

## 2025-01-14 DIAGNOSIS — O99.320 MARIJUANA USE DURING PREGNANCY: ICD-10-CM

## 2025-01-14 NOTE — TELEPHONE ENCOUNTER
----- Message from SASHA Barber - CNP sent at 1/14/2025  8:09 AM EST -----  GBS in urine- if symptomatic- Augmentin 500 BID x7 days  UDS pos for benzos, barbituates, amphetamines and THC- has rx for klonopin/adderall/oxy- recheck THC for drop

## 2025-01-15 LAB
CANNABINOIDS UR QL CFM: POSITIVE
THC UR CFM-MCNC: 481 NG/ML

## 2025-01-16 ENCOUNTER — LAB (OUTPATIENT)
Dept: OBGYN CLINIC | Age: 35
End: 2025-01-16

## 2025-01-16 DIAGNOSIS — Z3A.15 15 WEEKS GESTATION OF PREGNANCY: ICD-10-CM

## 2025-01-16 DIAGNOSIS — O09.92 HIGH-RISK PREGNANCY IN SECOND TRIMESTER: ICD-10-CM

## 2025-01-16 LAB
ABO + RH BLD: NORMAL
BASOPHILS # BLD: 0.02 K/UL (ref 0–0.2)
BASOPHILS NFR BLD: 0.3 % (ref 0–2)
BLOOD GROUP ANTIBODIES SERPL: NORMAL
DIFFERENTIAL METHOD BLD: ABNORMAL
EOSINOPHIL # BLD: 0.03 K/UL (ref 0–0.8)
EOSINOPHIL NFR BLD: 0.4 % (ref 0.5–7.8)
ERYTHROCYTE [DISTWIDTH] IN BLOOD BY AUTOMATED COUNT: 13.9 % (ref 11.9–14.6)
HCT VFR BLD AUTO: 32.6 % (ref 35.8–46.3)
HGB BLD-MCNC: 10.8 G/DL (ref 11.7–15.4)
HIV 1+2 AB+HIV1 P24 AG SERPL QL IA: NONREACTIVE
HIV 1/2 RESULT COMMENT: NORMAL
IMM GRANULOCYTES # BLD AUTO: 0.02 K/UL (ref 0–0.5)
IMM GRANULOCYTES NFR BLD AUTO: 0.3 % (ref 0–5)
LYMPHOCYTES # BLD: 2.51 K/UL (ref 0.5–4.6)
LYMPHOCYTES NFR BLD: 32.2 % (ref 13–44)
MCH RBC QN AUTO: 30.3 PG (ref 26.1–32.9)
MCHC RBC AUTO-ENTMCNC: 33.1 G/DL (ref 31.4–35)
MCV RBC AUTO: 91.6 FL (ref 82–102)
MONOCYTES # BLD: 0.4 K/UL (ref 0.1–1.3)
MONOCYTES NFR BLD: 5.1 % (ref 4–12)
NEUTS SEG # BLD: 4.82 K/UL (ref 1.7–8.2)
NEUTS SEG NFR BLD: 61.7 % (ref 43–78)
NRBC # BLD: 0 K/UL (ref 0–0.2)
PLATELET # BLD AUTO: 316 K/UL (ref 150–450)
PMV BLD AUTO: 10.1 FL (ref 9.4–12.3)
RBC # BLD AUTO: 3.56 M/UL (ref 4.05–5.2)
RUBV IGG SERPL IA-ACNC: 50.8 IU/ML
WBC # BLD AUTO: 7.8 K/UL (ref 4.3–11.1)

## 2025-01-17 LAB
RPR SER QL: NONREACTIVE
T PALLIDUM AB SER QL IA: REACTIVE

## 2025-01-18 LAB
HBV SURFACE AG SERPL QL IA: NEGATIVE
HCV IGG SERPL QL IA: REACTIVE S/CO RATIO
HCV RNA SERPL NAA+PROBE-ACNC: NORMAL IU/ML
INTERPRETATION: NORMAL
REF LAB TEST REF RANGE: NORMAL

## 2025-01-19 LAB — VZV IGG SER IA-ACNC: REACTIVE

## 2025-01-20 LAB
AMPHET UR QL CFM: NORMAL NG/ML
AMPHET+METHAMPHET UR-MCNC: NORMAL
AMPHET+METHAMPHET UR-MCNC: NORMAL
METHAMPHET UR QL CFM: NORMAL NG/ML

## 2025-01-21 ENCOUNTER — TELEPHONE (OUTPATIENT)
Dept: OBGYN CLINIC | Age: 35
End: 2025-01-21

## 2025-01-21 PROBLEM — O99.019 ANEMIA IN PREGNANCY: Status: ACTIVE | Noted: 2025-01-21

## 2025-01-21 LAB — HGB FRACT BLD ELPH-IMP: NORMAL

## 2025-01-21 NOTE — TELEPHONE ENCOUNTER
----- Message from SASHA Barber - CNP sent at 1/20/2025  8:41 AM EST -----  Reactive Hep C and syphilis, but confirmation neg for both  Daily fe supplement  Still waiting on fractionated hgb

## 2025-01-23 LAB
Lab: NORMAL
NTRA FETAL FRACTION: NORMAL
NTRA GENDER OF FETUS: NORMAL
NTRA MONOSOMY X AGE-BASED RISK TEXT: NORMAL
NTRA MONOSOMY X RESULT TEXT: NORMAL
NTRA MONOSOMY X RISK SCORE TEXT: NORMAL
NTRA TRIPLOIDY RESULT TEXT: NORMAL
NTRA TRISOMY 13 AGE-BASED RISK TEXT: NORMAL
NTRA TRISOMY 13 RESULT TEXT: NORMAL
NTRA TRISOMY 13 RISK SCORE TEXT: NORMAL
NTRA TRISOMY 18 AGE-BASED RISK TEXT: NORMAL
NTRA TRISOMY 18 RESULT TEXT: NORMAL
NTRA TRISOMY 18 RISK SCORE TEXT: NORMAL
NTRA TRISOMY 21 AGE-BASED RISK TEXT: NORMAL
NTRA TRISOMY 21 RESULT TEXT: NORMAL
NTRA TRISOMY 21 RISK SCORE TEXT: NORMAL

## 2025-01-28 LAB
Lab: NORMAL
Lab: NORMAL
REFERENCE LAB: NORMAL

## 2025-02-05 PROBLEM — O09.891 MEDICATION EXPOSURE DURING FIRST TRIMESTER OF PREGNANCY: Status: RESOLVED | Noted: 2023-11-09 | Resolved: 2025-02-05

## 2025-02-05 PROBLEM — F11.20 POLYSUBSTANCE DEPENDENCE INCLUDING OPIOID TYPE DRUG, CONTINUOUS USE (HCC): Status: RESOLVED | Noted: 2023-12-29 | Resolved: 2025-02-05

## 2025-02-05 PROBLEM — F19.11 HISTORY OF DRUG ABUSE (HCC): Status: RESOLVED | Noted: 2020-11-02 | Resolved: 2025-02-05

## 2025-02-05 PROBLEM — F39 MOOD DISORDER: Status: RESOLVED | Noted: 2020-01-03 | Resolved: 2025-02-05

## 2025-02-05 PROBLEM — F11.20 POLYSUBSTANCE DEPENDENCE INCLUDING OPIOID TYPE DRUG, CONTINUOUS USE (HCC): Status: ACTIVE | Noted: 2025-02-05

## 2025-02-05 PROBLEM — O99.340 MATERNAL MENTAL DISORDER, ANTEPARTUM: Status: RESOLVED | Noted: 2023-11-09 | Resolved: 2025-02-05

## 2025-02-05 PROBLEM — O99.342 MENTAL DISORDER AFFECTING PREGNANCY IN SECOND TRIMESTER: Status: ACTIVE | Noted: 2025-02-05

## 2025-02-05 PROBLEM — F98.8 ADD (ATTENTION DEFICIT DISORDER): Status: RESOLVED | Noted: 2023-11-09 | Resolved: 2025-02-05

## 2025-02-05 PROBLEM — F19.20 POLYSUBSTANCE DEPENDENCE INCLUDING OPIOID TYPE DRUG, CONTINUOUS USE (HCC): Status: RESOLVED | Noted: 2023-12-29 | Resolved: 2025-02-05

## 2025-02-05 PROBLEM — F19.20 POLYSUBSTANCE DEPENDENCE INCLUDING OPIOID TYPE DRUG, CONTINUOUS USE (HCC): Status: ACTIVE | Noted: 2025-02-05

## 2025-02-05 PROBLEM — O09.891 MEDICATION EXPOSURE DURING FIRST TRIMESTER OF PREGNANCY: Status: ACTIVE | Noted: 2025-02-05

## 2025-02-06 ENCOUNTER — TELEPHONE (OUTPATIENT)
Dept: OBGYN CLINIC | Age: 35
End: 2025-02-06

## 2025-02-17 NOTE — TELEPHONE ENCOUNTER
Patient was NS at [facility] for appointment today 2/17.    Called patient at both numbers listed in chart.  My chart message sent.

## 2025-02-21 ENCOUNTER — ROUTINE PRENATAL (OUTPATIENT)
Dept: OBGYN CLINIC | Age: 35
End: 2025-02-21

## 2025-02-21 VITALS — WEIGHT: 174.7 LBS | DIASTOLIC BLOOD PRESSURE: 72 MMHG | SYSTOLIC BLOOD PRESSURE: 104 MMHG | BODY MASS INDEX: 28.2 KG/M2

## 2025-02-21 DIAGNOSIS — M25.551 PAIN OF RIGHT HIP JOINT: ICD-10-CM

## 2025-02-21 DIAGNOSIS — O99.342 MENTAL DISORDER AFFECTING PREGNANCY IN SECOND TRIMESTER: ICD-10-CM

## 2025-02-21 DIAGNOSIS — O99.012 ANEMIA DURING PREGNANCY IN SECOND TRIMESTER: ICD-10-CM

## 2025-02-21 DIAGNOSIS — O09.891 MEDICATION EXPOSURE DURING FIRST TRIMESTER OF PREGNANCY: ICD-10-CM

## 2025-02-21 DIAGNOSIS — O09.893 SHORT INTERVAL BETWEEN PREGNANCIES AFFECTING PREGNANCY IN THIRD TRIMESTER, ANTEPARTUM: ICD-10-CM

## 2025-02-21 DIAGNOSIS — F11.20 POLYSUBSTANCE DEPENDENCE INCLUDING OPIOID TYPE DRUG, CONTINUOUS USE (HCC): ICD-10-CM

## 2025-02-21 DIAGNOSIS — R82.71 GBS BACTERIURIA: ICD-10-CM

## 2025-02-21 DIAGNOSIS — Z3A.21 21 WEEKS GESTATION OF PREGNANCY: ICD-10-CM

## 2025-02-21 DIAGNOSIS — O99.331: ICD-10-CM

## 2025-02-21 DIAGNOSIS — Z34.92 PRENATAL CARE, SECOND TRIMESTER: Primary | ICD-10-CM

## 2025-02-21 DIAGNOSIS — Z13.89 SCREENING FOR GENITOURINARY CONDITION: ICD-10-CM

## 2025-02-21 DIAGNOSIS — O09.92 HIGH-RISK PREGNANCY IN SECOND TRIMESTER: ICD-10-CM

## 2025-02-21 DIAGNOSIS — G89.29 CHRONIC LOW BACK PAIN WITH SCIATICA, SCIATICA LATERALITY UNSPECIFIED, UNSPECIFIED BACK PAIN LATERALITY: ICD-10-CM

## 2025-02-21 DIAGNOSIS — O99.320 MARIJUANA USE DURING PREGNANCY: ICD-10-CM

## 2025-02-21 DIAGNOSIS — M54.40 CHRONIC LOW BACK PAIN WITH SCIATICA, SCIATICA LATERALITY UNSPECIFIED, UNSPECIFIED BACK PAIN LATERALITY: ICD-10-CM

## 2025-02-21 DIAGNOSIS — F12.90 MARIJUANA USE DURING PREGNANCY: ICD-10-CM

## 2025-02-21 DIAGNOSIS — Z86.19 HISTORY OF SYPHILIS: ICD-10-CM

## 2025-02-21 DIAGNOSIS — F19.20 POLYSUBSTANCE DEPENDENCE INCLUDING OPIOID TYPE DRUG, CONTINUOUS USE (HCC): ICD-10-CM

## 2025-02-21 RX ORDER — FLUOXETINE HYDROCHLORIDE 40 MG/1
40 CAPSULE ORAL DAILY
COMMUNITY

## 2025-02-21 NOTE — PROGRESS NOTES
Has us at [facility].  No showed at [facility] appt and appt here.  Ptl precautions.  Repeat uds today.  She fell leaning over baby gate last night.  No bleeding.  Continued to feel flutters.  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  He is going to have a vasectomy.

## 2025-02-24 ENCOUNTER — ROUTINE PRENATAL (OUTPATIENT)
Dept: OBGYN CLINIC | Age: 35
End: 2025-02-24
Payer: MEDICAID

## 2025-02-24 VITALS — SYSTOLIC BLOOD PRESSURE: 100 MMHG | DIASTOLIC BLOOD PRESSURE: 60 MMHG

## 2025-02-24 DIAGNOSIS — Z86.19 HISTORY OF SYPHILIS: ICD-10-CM

## 2025-02-24 DIAGNOSIS — G89.29 CHRONIC LOW BACK PAIN WITH SCIATICA, SCIATICA LATERALITY UNSPECIFIED, UNSPECIFIED BACK PAIN LATERALITY: ICD-10-CM

## 2025-02-24 DIAGNOSIS — O99.012 ANEMIA DURING PREGNANCY IN SECOND TRIMESTER: ICD-10-CM

## 2025-02-24 DIAGNOSIS — O09.891 MEDICATION EXPOSURE DURING FIRST TRIMESTER OF PREGNANCY: ICD-10-CM

## 2025-02-24 DIAGNOSIS — O09.92 HIGH-RISK PREGNANCY IN SECOND TRIMESTER: Primary | ICD-10-CM

## 2025-02-24 DIAGNOSIS — Z3A.21 21 WEEKS GESTATION OF PREGNANCY: ICD-10-CM

## 2025-02-24 DIAGNOSIS — F11.20 POLYSUBSTANCE DEPENDENCE INCLUDING OPIOID TYPE DRUG, CONTINUOUS USE (HCC): ICD-10-CM

## 2025-02-24 DIAGNOSIS — F19.20 POLYSUBSTANCE DEPENDENCE INCLUDING OPIOID TYPE DRUG, CONTINUOUS USE (HCC): ICD-10-CM

## 2025-02-24 DIAGNOSIS — M54.40 CHRONIC LOW BACK PAIN WITH SCIATICA, SCIATICA LATERALITY UNSPECIFIED, UNSPECIFIED BACK PAIN LATERALITY: ICD-10-CM

## 2025-02-24 DIAGNOSIS — O99.331: ICD-10-CM

## 2025-02-24 DIAGNOSIS — R82.71 GBS BACTERIURIA: ICD-10-CM

## 2025-02-24 DIAGNOSIS — O99.342 MENTAL DISORDER AFFECTING PREGNANCY IN SECOND TRIMESTER: ICD-10-CM

## 2025-02-24 DIAGNOSIS — M25.551 PAIN OF RIGHT HIP JOINT: ICD-10-CM

## 2025-02-24 DIAGNOSIS — F12.90 MARIJUANA USE DURING PREGNANCY: ICD-10-CM

## 2025-02-24 DIAGNOSIS — O09.893 SHORT INTERVAL BETWEEN PREGNANCIES AFFECTING PREGNANCY IN THIRD TRIMESTER, ANTEPARTUM: ICD-10-CM

## 2025-02-24 DIAGNOSIS — O99.320 MARIJUANA USE DURING PREGNANCY: ICD-10-CM

## 2025-02-24 PROCEDURE — 76825 ECHO EXAM OF FETAL HEART: CPT | Performed by: OBSTETRICS & GYNECOLOGY

## 2025-02-24 PROCEDURE — 76817 TRANSVAGINAL US OBSTETRIC: CPT | Performed by: OBSTETRICS & GYNECOLOGY

## 2025-02-24 PROCEDURE — 99215 OFFICE O/P EST HI 40 MIN: CPT | Performed by: OBSTETRICS & GYNECOLOGY

## 2025-02-24 PROCEDURE — 76811 OB US DETAILED SNGL FETUS: CPT | Performed by: OBSTETRICS & GYNECOLOGY

## 2025-02-24 PROCEDURE — 76827 ECHO EXAM OF FETAL HEART: CPT | Performed by: OBSTETRICS & GYNECOLOGY

## 2025-02-24 PROCEDURE — 93325 DOPPLER ECHO COLOR FLOW MAPG: CPT | Performed by: OBSTETRICS & GYNECOLOGY

## 2025-02-24 ASSESSMENT — PATIENT HEALTH QUESTIONNAIRE - PHQ9
2. FEELING DOWN, DEPRESSED OR HOPELESS: NOT AT ALL
SUM OF ALL RESPONSES TO PHQ QUESTIONS 1-9: 0
SUM OF ALL RESPONSES TO PHQ QUESTIONS 1-9: 0
1. LITTLE INTEREST OR PLEASURE IN DOING THINGS: NOT AT ALL
SUM OF ALL RESPONSES TO PHQ QUESTIONS 1-9: 0
SUM OF ALL RESPONSES TO PHQ9 QUESTIONS 1 & 2: 0
SUM OF ALL RESPONSES TO PHQ QUESTIONS 1-9: 0

## 2025-02-24 NOTE — ASSESSMENT & PLAN NOTE
Short interval of pregnancy with less than 12 months between delivery and conception may be associated with maternal or fetal complications. Greatest risks are seen with interpregnancy interval (IPI) less than 6 months.   30 percent increase in risk of maternal anemia after IPI less than 6 months   In a 2006 meta-analysis including 10 studies, IPI less than 6 months was associated with a 60 percent increase in risk of low birth weight when compared with IPI of 18 to 23 months (pooled adjusted OR 1.61, 95% CI 1.39-1.86)  Increased risk of PPROM with IPI of 6 to 14 months and IPI less than 18 months   IPI <=6 months were at 3.6-fold increased risk for early spontaneous PTB (less than 34 weeks), no association with late  birth  IPI of less than six months was associated with increased risk for placental abruption (OR 1.8, 95%CI 1.2-2.7)  Three-fold increase in risk of uterine rupture among women undergoing TOLAC with short inter-delivery interval up to 18 months

## 2025-02-24 NOTE — PATIENT INSTRUCTIONS
Your Maternity Check List   Before Arriving to the Hospital     Find an OBGYN Doctor: [email or web address]  Maternity Pre-registration for Hospital: [email or web address]  Sign up for a Hospital Tour: [email or web address]  [place] for Prenatal Classes: [email or web address]   Car seat Safety Check: [email or web address]   Learn More: [email or web address]   Download the [employer] Pregnancy Jesús: (Scan QR Code below):  See educational videos, track your pregnancy, and Mom/Baby Care videos          Resources for Depression/Anxiety  Postpartum Support International (PSI).    PSI Warmline:  1-397-493-4PPD (0122).  WWW.POSTPARTUM.NET    Mom's IMPACTT  [email or web address]       In order to optimize maternal, fetal, and  health, we recommend the following vaccinations.   Flu- yearly (https://www.highriskpregnancyinfo.org/flu-facts-for-pregnancy)  Consider Covid vaccination/booster (https://www.highriskpregnancyinfo.org/covid-19-pregnancy)  TDaP after 28 weeks each pregnancy (https://www.highriskpregnancyinfo.org/tdap)  Consider RSV vaccine 32-36 weeks of pregnancy, if Sept- January.  (https://www.highriskpregnancyinfo.org/rsv)

## 2025-02-24 NOTE — PROGRESS NOTES
disorders, G6PD deficiencies etc).  Treatment then is directed to appropriate etiology.     Please draw CBC, reticulocyte count, transferrin saturation (includes TIBC, TSat, iron), B12 level, serum ferritin at next OB appt  If not performed previously, check hemoglobin electrophoresis.       If evidence of iron deficiency upon work-up, set up for IV iron infusions. Does not require H/O consult for this.         History of syphilis 11/09/2023     Priority: Low     Overview Note:     Treated in last pregnancy.    Reactive Hep C and RPR this pregnancy but confirmation testing negative.      Vaping during pregnancy 11/09/2023     Priority: Low     Overview Note:     Encouraged to quit. Contains nicotine.     02/24/25 [facility]: Reports daily vaping. Cessation encouraged.         Pain of right hip joint 07/30/2021     Priority: Low     Overview Note:     chronic for many years, worse spring 2021.        Return for 6-8 weeks, growth and repeat echo.   Laura Medeiros MD   An electronic signature was used to authenticate this note.    I have spent 55 minutes reviewing previous notes, test results and face to face with the patient discussing the diagnosis and importance of compliance with the treatment plan, as well as documenting on the day of the visit 02/24/2025. Normal ultrasound findings are not included in this time calculation. Full ultrasound data in ultrasound report. Limited ultrasound data included in office consult note.

## 2025-02-24 NOTE — ASSESSMENT & PLAN NOTE
Low dose Aspirin  mg daily is recommended to be started at 12-16 weeks (some benefit seen with starting up to 28 weeks) for the prevention of preeclampsia  in high risk women. Consider stopping Aspirin at 36 weeks.  Recommend Vitamin D 2000IU daily and Calcium 1000mg daily to aid in protection of bones and teeth.  Recommend use of PNV daily with well-balanced diet.  Unless instructed otherwise, recommend continuation of physical activity throughout pregnancy.       Genetic counseling was performed by physician after reviewing patient's genetic history.    The patient's Down syndrome age associated risk, as well as, risks of additional aneuploidy and genetic syndromes, are reduced by approximately 50% with a normal anatomy ultrasound. Ultrasound alone does not rule out all abnormalities of genetics and development.     Maternal serum screening for aneuploidy was discussed with the patient including first trimester NEHA-A/hCG, second trimester Quad screen (either in isolation or sequential with NEHA-A) as well as non-invasive prenatal testing (NIPT) for aneuploidy from a maternal blood sample.  Positive predictive and negative predictive values for these tests were explained, questions answered. Patient understands that these are screening tests that only assesses risk for select abnormalities (trisomies 13, 18, and 21, and sex chromosome abnormalities (NIPT), as well as markers for placental health (NEHA-A) and risk for open neural tube defects (quad)).  NIPT is designed for high risk populations, but should be considered by all patients who desire the current best option for screening for applicable genetic abnormalities.     Limitations of technology discussed based on maternal age, technical aspects of tests, and maternal BMI reviewed.  All questions answered and concerns discussed.     Patient elected to proceed with NIPT previously at OB office- results low risk.

## 2025-02-25 NOTE — ASSESSMENT & PLAN NOTE
Reassuring anatomy.   Family is aware can not rule out physiologic/developmental changes with polysubstance exposure.

## 2025-02-25 NOTE — ASSESSMENT & PLAN NOTE
Extensive review of concerns with MFM and TERRI in 2024 pregnancy.     Pt desires to continue current regimen as prescribed by PCP.     Reviewed recommendation to minimize medications as polysubstance exposure will increase risk for JODI/withdrawal.

## 2025-02-26 LAB
CANNABINOIDS UR QL CFM: POSITIVE
THC UR CFM-MCNC: 91 NG/ML

## 2025-03-24 ENCOUNTER — TELEPHONE (OUTPATIENT)
Dept: OBGYN CLINIC | Age: 35
End: 2025-03-24

## 2025-03-31 ENCOUNTER — ROUTINE PRENATAL (OUTPATIENT)
Dept: OBGYN CLINIC | Age: 35
End: 2025-03-31
Payer: MEDICAID

## 2025-03-31 VITALS — SYSTOLIC BLOOD PRESSURE: 112 MMHG | WEIGHT: 178.4 LBS | DIASTOLIC BLOOD PRESSURE: 62 MMHG | BODY MASS INDEX: 28.79 KG/M2

## 2025-03-31 DIAGNOSIS — F12.90 MARIJUANA USE DURING PREGNANCY: ICD-10-CM

## 2025-03-31 DIAGNOSIS — O09.893 SHORT INTERVAL BETWEEN PREGNANCIES AFFECTING PREGNANCY IN THIRD TRIMESTER, ANTEPARTUM: ICD-10-CM

## 2025-03-31 DIAGNOSIS — Z13.89 SCREENING FOR GENITOURINARY CONDITION: ICD-10-CM

## 2025-03-31 DIAGNOSIS — O99.342 MENTAL DISORDER AFFECTING PREGNANCY IN SECOND TRIMESTER: ICD-10-CM

## 2025-03-31 DIAGNOSIS — O99.331: Primary | ICD-10-CM

## 2025-03-31 DIAGNOSIS — Z3A.26 26 WEEKS GESTATION OF PREGNANCY: ICD-10-CM

## 2025-03-31 DIAGNOSIS — O09.92 HIGH-RISK PREGNANCY IN SECOND TRIMESTER: ICD-10-CM

## 2025-03-31 DIAGNOSIS — O09.891 MEDICATION EXPOSURE DURING FIRST TRIMESTER OF PREGNANCY: ICD-10-CM

## 2025-03-31 DIAGNOSIS — Z34.92 PRENATAL CARE, SECOND TRIMESTER: ICD-10-CM

## 2025-03-31 DIAGNOSIS — O99.320 MARIJUANA USE DURING PREGNANCY: ICD-10-CM

## 2025-03-31 LAB
GLUCOSE URINE, POC: NEGATIVE
PROTEIN,URINE, POC: NEGATIVE

## 2025-03-31 PROCEDURE — 81002 URINALYSIS NONAUTO W/O SCOPE: CPT | Performed by: NURSE PRACTITIONER

## 2025-03-31 PROCEDURE — 99213 OFFICE O/P EST LOW 20 MIN: CPT | Performed by: NURSE PRACTITIONER

## 2025-03-31 NOTE — PROGRESS NOTES
Doing well  Gfm  Rtc 2 wk with glucola.    1. Vaping during pregnancy  Overview:  Encouraged to quit. Contains nicotine.     02/24/25 [facility]: Reports daily vaping. Cessation encouraged.     2. Short interval between pregnancies affecting pregnancy in third trimester, antepartum  Overview:  Vaginal delivery 5/2024   3. Marijuana use during pregnancy  Overview:  12/16/24: + Marijuana  Urine confirmation added on 1/9/25:481  Advised of policy-1/14/25 2/21/25-+ dropping titer=91  Repeat UDS:    UDS pos for benzos, barbituates, amphetamines and THC- has rx for klonopin/adderall/oxy- recheck THC for drop     NEEDS CORD SEGMENT AT DELIVERY    4. High-risk pregnancy in second trimester  Overview:  NIPT:low risk  GTT:  Flu:  Tdap:    Seeing MFM on 2/17/25.    2/24/2025 at [facility]: Normal anatomy/echo, AC 63%, PAULA WNL, CL- 3.1 cm. Negative NIPT. For full details review formal ultrasound report.    Orders:  -     AMB POC OB URINE DIP  5. Medication exposure during first trimester of pregnancy  Overview:  Adderall, Oxycodone, Prozac, Neurontin and Klonopin.   6. Mental disorder affecting pregnancy in second trimester  Overview:  ADD, Anxiety/Depression, Bipolar     02/24/25 [facility]: Reports stable mood. Currently taking Adderall 20 mg BID, Prozac 40 mg qd and Klonopin 1 mg QID. Managed by PCP, .     7. 26 weeks gestation of pregnancy  -     AMB POC OB URINE DIP  8. Prenatal care, second trimester  -     AMB POC OB URINE DIP  9. Screening for genitourinary condition  -     AMB POC OB URINE DIP

## 2025-04-15 ENCOUNTER — TELEPHONE (OUTPATIENT)
Dept: OBGYN CLINIC | Age: 35
End: 2025-04-15

## 2025-04-15 NOTE — TELEPHONE ENCOUNTER
----- Message from Dr. Ky Pacheco MD sent at 4/15/2025 11:04 AM EDT -----  Regarding: FW: Follow-Up Consult  Please reach out to patient regarding MFM appt.  Thx. HUA  ----- Message -----  From: Laura Medeiros MD  Sent: 4/15/2025  10:15 AM EDT  To: Ky Pacheco MD  Subject: Follow-Up Consult

## 2025-04-22 ENCOUNTER — CLINICAL SUPPORT (OUTPATIENT)
Dept: OBGYN CLINIC | Age: 35
End: 2025-04-22

## 2025-04-22 ENCOUNTER — ROUTINE PRENATAL (OUTPATIENT)
Dept: OBGYN CLINIC | Age: 35
End: 2025-04-22
Payer: MEDICAID

## 2025-04-22 VITALS — WEIGHT: 181.5 LBS | BODY MASS INDEX: 29.29 KG/M2 | SYSTOLIC BLOOD PRESSURE: 118 MMHG | DIASTOLIC BLOOD PRESSURE: 70 MMHG

## 2025-04-22 DIAGNOSIS — Z34.83 PRENATAL CARE, SUBSEQUENT PREGNANCY, THIRD TRIMESTER: Primary | ICD-10-CM

## 2025-04-22 DIAGNOSIS — Z34.83 PRENATAL CARE, SUBSEQUENT PREGNANCY, THIRD TRIMESTER: ICD-10-CM

## 2025-04-22 DIAGNOSIS — Z13.1 SCREENING FOR DIABETES MELLITUS: ICD-10-CM

## 2025-04-22 DIAGNOSIS — O99.013 ANEMIA DURING PREGNANCY IN THIRD TRIMESTER: ICD-10-CM

## 2025-04-22 DIAGNOSIS — F12.90 MARIJUANA USE DURING PREGNANCY: ICD-10-CM

## 2025-04-22 DIAGNOSIS — O99.320 MARIJUANA USE DURING PREGNANCY: ICD-10-CM

## 2025-04-22 DIAGNOSIS — Z3A.29 29 WEEKS GESTATION OF PREGNANCY: ICD-10-CM

## 2025-04-22 DIAGNOSIS — Z13.89 SCREENING FOR GENITOURINARY CONDITION: ICD-10-CM

## 2025-04-22 LAB
AMPHET UR QL SCN: NEGATIVE
BARBITURATES UR QL SCN: NEGATIVE
BASOPHILS # BLD: 0.03 K/UL (ref 0–0.2)
BASOPHILS NFR BLD: 0.3 % (ref 0–2)
BENZODIAZ UR QL: POSITIVE
CANNABINOIDS UR QL SCN: POSITIVE
COCAINE UR QL SCN: NEGATIVE
DIFFERENTIAL METHOD BLD: ABNORMAL
EOSINOPHIL # BLD: 0.04 K/UL (ref 0–0.8)
EOSINOPHIL NFR BLD: 0.4 % (ref 0.5–7.8)
ERYTHROCYTE [DISTWIDTH] IN BLOOD BY AUTOMATED COUNT: 13.8 % (ref 11.9–14.6)
GLUCOSE 1 HOUR: 61 MG/DL
GLUCOSE URINE, POC: NEGATIVE
HCT VFR BLD AUTO: 33.1 % (ref 35.8–46.3)
HGB BLD-MCNC: 11 G/DL (ref 11.7–15.4)
IMM GRANULOCYTES # BLD AUTO: 0.05 K/UL (ref 0–0.5)
IMM GRANULOCYTES NFR BLD AUTO: 0.5 % (ref 0–5)
LYMPHOCYTES # BLD: 1.99 K/UL (ref 0.5–4.6)
LYMPHOCYTES NFR BLD: 21.1 % (ref 13–44)
MCH RBC QN AUTO: 31 PG (ref 26.1–32.9)
MCHC RBC AUTO-ENTMCNC: 33.2 G/DL (ref 31.4–35)
MCV RBC AUTO: 93.2 FL (ref 82–102)
METHADONE UR QL: NEGATIVE
MONOCYTES # BLD: 0.57 K/UL (ref 0.1–1.3)
MONOCYTES NFR BLD: 6 % (ref 4–12)
NEUTS SEG # BLD: 6.76 K/UL (ref 1.7–8.2)
NEUTS SEG NFR BLD: 71.7 % (ref 43–78)
NRBC # BLD: 0 K/UL (ref 0–0.2)
OPIATES UR QL: NEGATIVE
PCP UR QL: NEGATIVE
PLATELET # BLD AUTO: 257 K/UL (ref 150–450)
PMV BLD AUTO: 10.2 FL (ref 9.4–12.3)
PROTEIN,URINE, POC: NEGATIVE
RBC # BLD AUTO: 3.55 M/UL (ref 4.05–5.2)
WBC # BLD AUTO: 9.4 K/UL (ref 4.3–11.1)

## 2025-04-22 PROCEDURE — 99213 OFFICE O/P EST LOW 20 MIN: CPT | Performed by: NURSE PRACTITIONER

## 2025-04-22 PROCEDURE — 81002 URINALYSIS NONAUTO W/O SCOPE: CPT | Performed by: NURSE PRACTITIONER

## 2025-04-22 NOTE — PROGRESS NOTES
Doing well. No complaints. PTL pecautions and JULIAN reviewed. UDS today. Glucola today. To schedule MFM follow up!

## 2025-04-23 ENCOUNTER — RESULTS FOLLOW-UP (OUTPATIENT)
Dept: OBGYN CLINIC | Age: 35
End: 2025-04-23

## 2025-04-23 LAB
RPR SER QL: NONREACTIVE
T PALLIDUM AB SER QL IA: REACTIVE

## 2025-04-26 LAB
CANNABINOIDS UR QL CFM: POSITIVE
THC UR CFM-MCNC: 40 NG/ML

## 2025-04-30 ENCOUNTER — HOSPITAL ENCOUNTER (OUTPATIENT)
Age: 35
Discharge: HOME | End: 2025-04-30
Attending: OBSTETRICS & GYNECOLOGY | Admitting: OBSTETRICS & GYNECOLOGY
Payer: MEDICAID

## 2025-04-30 VITALS
BODY MASS INDEX: 29.09 KG/M2 | RESPIRATION RATE: 16 BRPM | WEIGHT: 181 LBS | HEIGHT: 66 IN | OXYGEN SATURATION: 98 % | HEART RATE: 74 BPM | DIASTOLIC BLOOD PRESSURE: 59 MMHG | TEMPERATURE: 98.8 F | SYSTOLIC BLOOD PRESSURE: 106 MMHG

## 2025-04-30 PROBLEM — N89.8 VAGINAL DISCHARGE: Status: ACTIVE | Noted: 2025-04-30

## 2025-04-30 PROBLEM — N89.8 VAGINAL DISCHARGE: Status: RESOLVED | Noted: 2025-04-30 | Resolved: 2025-04-30

## 2025-04-30 LAB
AMNISURE, POC: NEGATIVE
Lab: NORMAL
NEGATIVE QC PASS/FAIL: NORMAL
POSITIVE QC PASS/FAIL: NORMAL

## 2025-04-30 PROCEDURE — 59025 FETAL NON-STRESS TEST: CPT

## 2025-04-30 PROCEDURE — 99284 EMERGENCY DEPT VISIT MOD MDM: CPT

## 2025-04-30 PROCEDURE — 84112 EVAL AMNIOTIC FLUID PROTEIN: CPT
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
## 2025-05-06 ENCOUNTER — ROUTINE PRENATAL (OUTPATIENT)
Dept: OBGYN CLINIC | Age: 35
End: 2025-05-06
Payer: MEDICAID

## 2025-05-06 VITALS — DIASTOLIC BLOOD PRESSURE: 60 MMHG | SYSTOLIC BLOOD PRESSURE: 126 MMHG | BODY MASS INDEX: 29.34 KG/M2 | WEIGHT: 181.8 LBS

## 2025-05-06 DIAGNOSIS — O09.93 HIGH-RISK PREGNANCY IN THIRD TRIMESTER: Primary | ICD-10-CM

## 2025-05-06 DIAGNOSIS — Z3A.31 31 WEEKS GESTATION OF PREGNANCY: ICD-10-CM

## 2025-05-06 DIAGNOSIS — R82.71 GBS BACTERIURIA: ICD-10-CM

## 2025-05-06 DIAGNOSIS — O09.893 SHORT INTERVAL BETWEEN PREGNANCIES AFFECTING PREGNANCY IN THIRD TRIMESTER, ANTEPARTUM: ICD-10-CM

## 2025-05-06 DIAGNOSIS — Z13.89 SCREENING FOR GENITOURINARY CONDITION: ICD-10-CM

## 2025-05-06 DIAGNOSIS — O99.013 ANEMIA DURING PREGNANCY IN THIRD TRIMESTER: ICD-10-CM

## 2025-05-06 LAB
GLUCOSE URINE, POC: NEGATIVE
PROTEIN,URINE, POC: NORMAL

## 2025-05-06 PROCEDURE — 81002 URINALYSIS NONAUTO W/O SCOPE: CPT | Performed by: STUDENT IN AN ORGANIZED HEALTH CARE EDUCATION/TRAINING PROGRAM

## 2025-05-06 PROCEDURE — 99213 OFFICE O/P EST LOW 20 MIN: CPT | Performed by: STUDENT IN AN ORGANIZED HEALTH CARE EDUCATION/TRAINING PROGRAM

## 2025-05-06 NOTE — PROGRESS NOTES
multiple PCPs monthly). Previously on tramadol 50mg (120Rx by PCP monthly until 2023).      HIGH risk for  withdrawal with polysubstance use exacerbated by ni    Sciatic pain     neurontin    Trauma       Past Surgical History:   Procedure Laterality Date    ORTHOPEDIC SURGERY      3 on right leg , 1 left leg       ROS:  Feeling well. No dyspnea or chest pain on exertion.  No abdominal pain, change in bowel habits, black or bloody stools.  No urinary tract symptoms.   OB ROS: No vaginal bleeding, cxns, LOF, decreased FM. No HA, vision changes, abdominal pain.    PHYSICAL EXAM:  /60   Wt 82.5 kg (181 lb 12.8 oz)   LMP 2024   BMI 29.34 kg/m²        ASSESSMENT / PLAN:  1. High-risk pregnancy in third trimester  Overview:  NIPT:low risk  GTT:61  Flu:  Tdap: declined    Seeing MFM on 25.    2025 at [facility]: Normal anatomy/echo, AC 63%, PAULA WNL, CL- 3.1 cm. Negative NIPT. For full details review formal ultrasound report.    Orders:  -     AMB POC OB URINE DIP  2. Short interval between pregnancies affecting pregnancy in third trimester, antepartum  Overview:  Vaginal delivery 2024   3. GBS bacteriuria  Overview:  Treat in labor.  4. Anemia during pregnancy in third trimester  Overview:  HGB-10.8 daily iron  Recheck 28 wks: Hb 11.0  5. 31 weeks gestation of pregnancy  -     AMB POC OB URINE DIP  6. Screening for genitourinary condition  -     AMB POC OB URINE DIP         Avril Acosta MD

## 2025-05-20 ENCOUNTER — ROUTINE PRENATAL (OUTPATIENT)
Dept: OBGYN CLINIC | Age: 35
End: 2025-05-20

## 2025-05-20 VITALS — SYSTOLIC BLOOD PRESSURE: 119 MMHG | DIASTOLIC BLOOD PRESSURE: 68 MMHG

## 2025-05-20 DIAGNOSIS — O09.891 MEDICATION EXPOSURE DURING FIRST TRIMESTER OF PREGNANCY: ICD-10-CM

## 2025-05-20 DIAGNOSIS — M54.40 CHRONIC LOW BACK PAIN WITH SCIATICA, SCIATICA LATERALITY UNSPECIFIED, UNSPECIFIED BACK PAIN LATERALITY: ICD-10-CM

## 2025-05-20 DIAGNOSIS — O09.893 SHORT INTERVAL BETWEEN PREGNANCIES AFFECTING PREGNANCY IN THIRD TRIMESTER, ANTEPARTUM: ICD-10-CM

## 2025-05-20 DIAGNOSIS — O09.93 HIGH-RISK PREGNANCY IN THIRD TRIMESTER: Primary | ICD-10-CM

## 2025-05-20 DIAGNOSIS — O36.5990 FETAL GROWTH RESTRICTION ANTEPARTUM: ICD-10-CM

## 2025-05-20 DIAGNOSIS — F12.90 MARIJUANA USE DURING PREGNANCY: ICD-10-CM

## 2025-05-20 DIAGNOSIS — G89.29 CHRONIC LOW BACK PAIN WITH SCIATICA, SCIATICA LATERALITY UNSPECIFIED, UNSPECIFIED BACK PAIN LATERALITY: ICD-10-CM

## 2025-05-20 DIAGNOSIS — F19.20 POLYSUBSTANCE DEPENDENCE INCLUDING OPIOID TYPE DRUG, CONTINUOUS USE (HCC): ICD-10-CM

## 2025-05-20 DIAGNOSIS — O99.320 MARIJUANA USE DURING PREGNANCY: ICD-10-CM

## 2025-05-20 DIAGNOSIS — Z86.19 HISTORY OF SYPHILIS: ICD-10-CM

## 2025-05-20 DIAGNOSIS — F11.20 POLYSUBSTANCE DEPENDENCE INCLUDING OPIOID TYPE DRUG, CONTINUOUS USE (HCC): ICD-10-CM

## 2025-05-20 DIAGNOSIS — O99.013 ANEMIA DURING PREGNANCY IN THIRD TRIMESTER: ICD-10-CM

## 2025-05-20 DIAGNOSIS — R82.71 GBS BACTERIURIA: ICD-10-CM

## 2025-05-20 DIAGNOSIS — M25.551 PAIN OF RIGHT HIP JOINT: ICD-10-CM

## 2025-05-20 DIAGNOSIS — O99.331: ICD-10-CM

## 2025-05-20 DIAGNOSIS — Z3A.33 33 WEEKS GESTATION OF PREGNANCY: ICD-10-CM

## 2025-05-20 DIAGNOSIS — O99.342 MENTAL DISORDER AFFECTING PREGNANCY IN SECOND TRIMESTER: ICD-10-CM

## 2025-05-20 RX ORDER — CEFPODOXIME PROXETIL 200 MG/1
400 TABLET, FILM COATED ORAL 2 TIMES DAILY
COMMUNITY
Start: 2025-05-15 | End: 2025-05-20

## 2025-05-20 RX ORDER — CEFUROXIME AXETIL 500 MG/1
TABLET ORAL
COMMUNITY
Start: 2025-05-15

## 2025-05-20 ASSESSMENT — PATIENT HEALTH QUESTIONNAIRE - PHQ9
SUM OF ALL RESPONSES TO PHQ QUESTIONS 1-9: 2
SUM OF ALL RESPONSES TO PHQ QUESTIONS 1-9: 2
1. LITTLE INTEREST OR PLEASURE IN DOING THINGS: SEVERAL DAYS
2. FEELING DOWN, DEPRESSED OR HOPELESS: SEVERAL DAYS
SUM OF ALL RESPONSES TO PHQ QUESTIONS 1-9: 2
SUM OF ALL RESPONSES TO PHQ QUESTIONS 1-9: 2

## 2025-05-20 NOTE — PROGRESS NOTES
[facility] MATERNAL FETAL MEDICINE    [address] [place] [address]  P- [phone]  H-[phone]     MFM Follow-up Visit  Lauren Moctezuma (1990) is a 35 y.o.  at 33w6d with 2025, by Ultrasound.     Presents for evaluation of the following chief complaint(s):   Chief Complaint   Patient presents with    Ultrasound    High Risk Pregnancy       Anemia, ADD, Anxiety/Depression, Chronic back pain, Medication exposure, Short interval pregnancy, Vaping, +UDS         Patient is a SAHM.     Patient's fipeter Peralta and 2 sons, Chon and Tony are present today. Expecting baby boy, Ivan.      Personal and family history reviewed and updated as indicated.   Records from pregnancy reviewed. Chart updated to portray current issues.     Pt is scheduled to see Primary OB ([facility]) on 25    Interval history since prior appt reviewed and chart updated as indicated.    {OBsymptoms:05947}      Mood evaluated today based on discussion with pt and PHQ screen.   {antidepressants:53984::\"Mood Reassuring today\",\"Recommend lifestyle/behavioral modifications to enhance mood (exercise, sunshine, mood apps, nutritional modifications, etc). \"}    Reviewed gestational age precautions and activity goals/limitations; addressed normal pregnancy complaints, reassured and offered suggestions for care  Nutritional counseling as well as specific goals based on current maternal and fetal status; options for GERD, constipation, other common complaints reviewed  Reviewed gestational age appropriate preventive care regarding communicable disease transmission and vaccines as appropriate (including flu, TDaP >28wk, RSV 32-36wk (-), as well as, COVID.)  All questions answered and concerns discussed. Additional recommendations in problem list.     Exam:     There were no vitals filed for this visit.   Applicable labs reviewed.   Appropriate examination performed as documented.    Please see formal 
needed.       Mental disorder affecting pregnancy in second trimester 02/05/2025     Priority: Medium     Overview Note:     ADD, Anxiety/Depression, Bipolar     02/24/25 [facility]: Reports stable mood. Currently taking Adderall 20 mg BID, Prozac 40 mg qd and Klonopin 1 mg QID. Managed by PCP, .   05/20/25 [facility]: Patient reports increase stress/anxiety with recent move from [facility] to [place] to live with her Step Father.  Patient reports her and FOB/previous fiance' no longer together and he will have the kids until she finds accommodations  That can support her kids.Her and FOB had a falling out recently and she's still adjusting. Still on previously reported medications.        Polysubstance dependence including opioid type drug, continuous use (HCC) 02/05/2025     Priority: Medium     Overview Note:     See Med Exposure in 1st Tri Problem.     Adderall, Oxycodone, Prozac, Neurontin and Klonopin daily.     02/24/25 [facility]: Reports taking Adderall 20 mg BID, Oxycodone 10 mg q 4-6hrs PRN (takes daily), Prozac 40 mg qd, Neurontin 800 mg TID, Klonopin 1 mg QID. Medication is prescribed and managed by  at [facility].   05/20/25 [facility]: Still on above medication        GBS bacteriuria 01/14/2025     Priority: Medium     Overview Note:     Treat in labor.      Marijuana use during pregnancy 01/29/2024     Priority: Medium     Overview Note:     12/16/24: + Marijuana  Urine confirmation added on 1/9/25:481  Advised of policy-1/14/25 2/21/25-+ dropping titer=91  Repeat UDS:4/22/25-+ marijuana titer pending (40)  Repeat UDS:    UDS pos for benzos, barbituates, amphetamines and THC- has rx for klonopin/adderall/oxy- recheck THC for drop     NEEDS CORD SEGMENT AT DELIVERY        Short interval between pregnancies affecting pregnancy in third trimester, antepartum 12/29/2023     Priority: Medium     Overview Note:     Vaginal delivery 5/2024       Chronic low back pain 01/03/2020

## 2025-05-21 NOTE — ASSESSMENT & PLAN NOTE
UDS results not consistent with medications prescribed and obtained per PDMP            Pt does not desire to make changes. PCP continues to prescribe, aware of pregnant status.     UDS and cord blood at delivery  NICU to be aware at time of admission.     Pt has declined [facility] Referral.

## 2025-05-21 NOTE — PATIENT INSTRUCTIONS
Your Maternity Check List   Before Arriving to the Hospital     Find an OBGYN Doctor: [email or web address]  Maternity Pre-registration for Hospital: [email or web address]  Sign up for a Hospital Tour: [email or web address]  [place] for Prenatal Classes: [email or web address]   Car seat Safety Check: [email or web address]   Learn More: [email or web address]   Download the [employer]® Pregnancy Jesús: (Scan QR Code below):  See educational videos, track your pregnancy, and Mom/Baby Care videos

## 2025-05-28 ENCOUNTER — ROUTINE PRENATAL (OUTPATIENT)
Dept: OBGYN CLINIC | Age: 35
End: 2025-05-28
Payer: MEDICAID

## 2025-05-28 VITALS — BODY MASS INDEX: 28.86 KG/M2 | SYSTOLIC BLOOD PRESSURE: 120 MMHG | WEIGHT: 178.8 LBS | DIASTOLIC BLOOD PRESSURE: 78 MMHG

## 2025-05-28 DIAGNOSIS — Z3A.35 35 WEEKS GESTATION OF PREGNANCY: ICD-10-CM

## 2025-05-28 DIAGNOSIS — O99.013 ANEMIA DURING PREGNANCY IN THIRD TRIMESTER: ICD-10-CM

## 2025-05-28 DIAGNOSIS — O09.93 HIGH-RISK PREGNANCY IN THIRD TRIMESTER: Primary | ICD-10-CM

## 2025-05-28 DIAGNOSIS — Z13.89 SCREENING FOR GENITOURINARY CONDITION: ICD-10-CM

## 2025-05-28 LAB
GLUCOSE URINE, POC: NEGATIVE
PROTEIN,URINE, POC: NORMAL

## 2025-05-28 PROCEDURE — 99213 OFFICE O/P EST LOW 20 MIN: CPT | Performed by: NURSE PRACTITIONER

## 2025-05-28 PROCEDURE — 81002 URINALYSIS NONAUTO W/O SCOPE: CPT | Performed by: NURSE PRACTITIONER
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
## 2025-06-04 ENCOUNTER — ROUTINE PRENATAL (OUTPATIENT)
Dept: OBGYN CLINIC | Age: 35
End: 2025-06-04
Payer: MEDICAID

## 2025-06-04 VITALS — DIASTOLIC BLOOD PRESSURE: 68 MMHG | WEIGHT: 182.8 LBS | BODY MASS INDEX: 29.5 KG/M2 | SYSTOLIC BLOOD PRESSURE: 120 MMHG

## 2025-06-04 DIAGNOSIS — Z36.85 ANTENATAL SCREENING FOR STREPTOCOCCUS B: ICD-10-CM

## 2025-06-04 DIAGNOSIS — R82.71 GBS BACTERIURIA: ICD-10-CM

## 2025-06-04 DIAGNOSIS — O09.93 HIGH-RISK PREGNANCY IN THIRD TRIMESTER: Primary | ICD-10-CM

## 2025-06-04 DIAGNOSIS — F12.90 MARIJUANA USE DURING PREGNANCY: ICD-10-CM

## 2025-06-04 DIAGNOSIS — Z13.89 SCREENING FOR GENITOURINARY CONDITION: ICD-10-CM

## 2025-06-04 DIAGNOSIS — Z3A.36 36 WEEKS GESTATION OF PREGNANCY: ICD-10-CM

## 2025-06-04 DIAGNOSIS — O99.320 MARIJUANA USE DURING PREGNANCY: ICD-10-CM

## 2025-06-04 DIAGNOSIS — F11.20 POLYSUBSTANCE DEPENDENCE INCLUDING OPIOID TYPE DRUG, CONTINUOUS USE (HCC): ICD-10-CM

## 2025-06-04 DIAGNOSIS — O09.893 SHORT INTERVAL BETWEEN PREGNANCIES AFFECTING PREGNANCY IN THIRD TRIMESTER, ANTEPARTUM: ICD-10-CM

## 2025-06-04 DIAGNOSIS — F19.20 POLYSUBSTANCE DEPENDENCE INCLUDING OPIOID TYPE DRUG, CONTINUOUS USE (HCC): ICD-10-CM

## 2025-06-04 LAB
AMPHET UR QL SCN: NEGATIVE
BARBITURATES UR QL SCN: NEGATIVE
BENZODIAZ UR QL: NEGATIVE
CANNABINOIDS UR QL SCN: NEGATIVE
COCAINE UR QL SCN: NEGATIVE
GLUCOSE URINE, POC: NEGATIVE
Lab: NORMAL
METHADONE UR QL: NEGATIVE
OPIATES UR QL: NEGATIVE
PCP UR QL: NEGATIVE
PROTEIN,URINE, POC: NEGATIVE

## 2025-06-04 PROCEDURE — 81002 URINALYSIS NONAUTO W/O SCOPE: CPT | Performed by: STUDENT IN AN ORGANIZED HEALTH CARE EDUCATION/TRAINING PROGRAM

## 2025-06-04 PROCEDURE — 99213 OFFICE O/P EST LOW 20 MIN: CPT | Performed by: STUDENT IN AN ORGANIZED HEALTH CARE EDUCATION/TRAINING PROGRAM
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
## 2025-06-07 LAB
BACTERIA SPEC CULT: NORMAL
CANNABINOIDS UR QL CFM: NEGATIVE
SERVICE CMNT-IMP: NORMAL

## 2025-06-11 ENCOUNTER — ROUTINE PRENATAL (OUTPATIENT)
Dept: OBGYN CLINIC | Age: 35
End: 2025-06-11
Payer: MEDICAID

## 2025-06-11 VITALS — SYSTOLIC BLOOD PRESSURE: 110 MMHG | WEIGHT: 183.1 LBS | BODY MASS INDEX: 29.55 KG/M2 | DIASTOLIC BLOOD PRESSURE: 60 MMHG

## 2025-06-11 DIAGNOSIS — O09.93 HIGH-RISK PREGNANCY IN THIRD TRIMESTER: Primary | ICD-10-CM

## 2025-06-11 DIAGNOSIS — O09.893 SHORT INTERVAL BETWEEN PREGNANCIES AFFECTING PREGNANCY IN THIRD TRIMESTER, ANTEPARTUM: ICD-10-CM

## 2025-06-11 DIAGNOSIS — F12.90 MARIJUANA USE DURING PREGNANCY: ICD-10-CM

## 2025-06-11 DIAGNOSIS — O99.320 MARIJUANA USE DURING PREGNANCY: ICD-10-CM

## 2025-06-11 DIAGNOSIS — R82.71 GBS BACTERIURIA: ICD-10-CM

## 2025-06-11 DIAGNOSIS — Z13.89 SCREENING FOR GENITOURINARY CONDITION: ICD-10-CM

## 2025-06-11 DIAGNOSIS — Z3A.37 37 WEEKS GESTATION OF PREGNANCY: ICD-10-CM

## 2025-06-11 LAB
GLUCOSE URINE, POC: NEGATIVE
PROTEIN,URINE, POC: NORMAL

## 2025-06-11 PROCEDURE — 99213 OFFICE O/P EST LOW 20 MIN: CPT | Performed by: STUDENT IN AN ORGANIZED HEALTH CARE EDUCATION/TRAINING PROGRAM

## 2025-06-11 PROCEDURE — 81002 URINALYSIS NONAUTO W/O SCOPE: CPT | Performed by: STUDENT IN AN ORGANIZED HEALTH CARE EDUCATION/TRAINING PROGRAM

## 2025-06-11 NOTE — PROGRESS NOTES
gestation of pregnancy  -     AMB POC OB URINE DIP  6. Screening for genitourinary condition  -     AMB POC OB URINE DIP         Avril Acosta MD

## 2025-06-12 ENCOUNTER — ANESTHESIA (OUTPATIENT)
Dept: LABOR AND DELIVERY | Age: 35
End: 2025-06-12
Payer: MEDICAID

## 2025-06-12 ENCOUNTER — HOSPITAL ENCOUNTER (INPATIENT)
Age: 35
LOS: 3 days | Discharge: HOME | DRG: 560 | End: 2025-06-15
Attending: OBSTETRICS & GYNECOLOGY | Admitting: OBSTETRICS & GYNECOLOGY
Payer: MEDICAID

## 2025-06-12 ENCOUNTER — ANESTHESIA EVENT (OUTPATIENT)
Dept: LABOR AND DELIVERY | Age: 35
End: 2025-06-12
Payer: MEDICAID

## 2025-06-12 PROBLEM — Z37.9 NORMAL LABOR: Status: ACTIVE | Noted: 2025-06-12

## 2025-06-12 LAB
ABO + RH BLD: NORMAL
AMPHET UR QL SCN: POSITIVE
BARBITURATES UR QL SCN: NEGATIVE
BASOPHILS # BLD: 0.03 K/UL (ref 0–0.2)
BASOPHILS NFR BLD: 0.3 % (ref 0–2)
BENZODIAZ UR QL: POSITIVE
BLOOD GROUP ANTIBODIES SERPL: NORMAL
CANNABINOIDS UR QL SCN: NEGATIVE
COCAINE UR QL SCN: NEGATIVE
DIFFERENTIAL METHOD BLD: ABNORMAL
EOSINOPHIL # BLD: 0.1 K/UL (ref 0–0.8)
EOSINOPHIL NFR BLD: 0.9 % (ref 0.5–7.8)
ERYTHROCYTE [DISTWIDTH] IN BLOOD BY AUTOMATED COUNT: 13.1 % (ref 11.9–14.6)
HCT VFR BLD AUTO: 33.8 % (ref 35.8–46.3)
HGB BLD-MCNC: 11.4 G/DL (ref 11.7–15.4)
IMM GRANULOCYTES # BLD AUTO: 0.04 K/UL (ref 0–0.5)
IMM GRANULOCYTES NFR BLD AUTO: 0.4 % (ref 0–5)
LYMPHOCYTES # BLD: 2.25 K/UL (ref 0.5–4.6)
LYMPHOCYTES NFR BLD: 19.8 % (ref 13–44)
Lab: ABNORMAL
MCH RBC QN AUTO: 30.6 PG (ref 26.1–32.9)
MCHC RBC AUTO-ENTMCNC: 33.7 G/DL (ref 31.4–35)
MCV RBC AUTO: 90.9 FL (ref 82–102)
METHADONE UR QL: NEGATIVE
MONOCYTES # BLD: 0.72 K/UL (ref 0.1–1.3)
MONOCYTES NFR BLD: 6.3 % (ref 4–12)
NEUTS SEG # BLD: 8.25 K/UL (ref 1.7–8.2)
NEUTS SEG NFR BLD: 72.3 % (ref 43–78)
NRBC # BLD: 0 K/UL (ref 0–0.2)
OPIATES UR QL: NEGATIVE
PCP UR QL: NEGATIVE
PLATELET # BLD AUTO: 264 K/UL (ref 150–450)
PMV BLD AUTO: 10.2 FL (ref 9.4–12.3)
RBC # BLD AUTO: 3.72 M/UL (ref 4.05–5.2)
SPECIMEN EXP DATE BLD: NORMAL
WBC # BLD AUTO: 11.4 K/UL (ref 4.3–11.1)

## 2025-06-12 PROCEDURE — 4A1HXCZ MONITORING OF PRODUCTS OF CONCEPTION, CARDIAC RATE, EXTERNAL APPROACH: ICD-10-PCS | Performed by: STUDENT IN AN ORGANIZED HEALTH CARE EDUCATION/TRAINING PROGRAM

## 2025-06-12 PROCEDURE — 86592 SYPHILIS TEST NON-TREP QUAL: CPT

## 2025-06-12 PROCEDURE — 86850 RBC ANTIBODY SCREEN: CPT

## 2025-06-12 PROCEDURE — 2580000003 HC RX 258: Performed by: OBSTETRICS & GYNECOLOGY

## 2025-06-12 PROCEDURE — 2500000003 HC RX 250 WO HCPCS: Performed by: NURSE ANESTHETIST, CERTIFIED REGISTERED

## 2025-06-12 PROCEDURE — 86901 BLOOD TYPING SEROLOGIC RH(D): CPT

## 2025-06-12 PROCEDURE — 7100000010 HC PHASE II RECOVERY - FIRST 15 MIN

## 2025-06-12 PROCEDURE — 6360000002 HC RX W HCPCS: Performed by: ANESTHESIOLOGY

## 2025-06-12 PROCEDURE — 80307 DRUG TEST PRSMV CHEM ANLYZR: CPT

## 2025-06-12 PROCEDURE — 59409 OBSTETRICAL CARE: CPT | Performed by: STUDENT IN AN ORGANIZED HEALTH CARE EDUCATION/TRAINING PROGRAM

## 2025-06-12 PROCEDURE — 86900 BLOOD TYPING SEROLOGIC ABO: CPT

## 2025-06-12 PROCEDURE — 7220000101 HC DELIVERY VAGINAL/SINGLE

## 2025-06-12 PROCEDURE — 7210000100 HC LABOR FEE PER 1 HR

## 2025-06-12 PROCEDURE — 86780 TREPONEMA PALLIDUM: CPT

## 2025-06-12 PROCEDURE — 6360000002 HC RX W HCPCS: Performed by: OBSTETRICS & GYNECOLOGY

## 2025-06-12 PROCEDURE — 00HU33Z INSERTION OF INFUSION DEVICE INTO SPINAL CANAL, PERCUTANEOUS APPROACH: ICD-10-PCS | Performed by: ANESTHESIOLOGY

## 2025-06-12 PROCEDURE — 6360000002 HC RX W HCPCS: Performed by: NURSE ANESTHETIST, CERTIFIED REGISTERED

## 2025-06-12 PROCEDURE — 85025 COMPLETE CBC W/AUTO DIFF WBC: CPT

## 2025-06-12 PROCEDURE — 7100000011 HC PHASE II RECOVERY - ADDTL 15 MIN

## 2025-06-12 PROCEDURE — 2580000003 HC RX 258: Performed by: STUDENT IN AN ORGANIZED HEALTH CARE EDUCATION/TRAINING PROGRAM

## 2025-06-12 PROCEDURE — 99282 EMERGENCY DEPT VISIT SF MDM: CPT

## 2025-06-12 PROCEDURE — 1100000000 HC RM PRIVATE

## 2025-06-12 PROCEDURE — 6370000000 HC RX 637 (ALT 250 FOR IP): Performed by: STUDENT IN AN ORGANIZED HEALTH CARE EDUCATION/TRAINING PROGRAM

## 2025-06-12 PROCEDURE — 51701 INSERT BLADDER CATHETER: CPT

## 2025-06-12 RX ORDER — TERBUTALINE SULFATE 1 MG/ML
0.25 INJECTION SUBCUTANEOUS
Status: DISCONTINUED | OUTPATIENT
Start: 2025-06-12 | End: 2025-06-15 | Stop reason: HOSPADM

## 2025-06-12 RX ORDER — ACETAMINOPHEN 500 MG
1000 TABLET ORAL EVERY 8 HOURS SCHEDULED
Status: DISCONTINUED | OUTPATIENT
Start: 2025-06-12 | End: 2025-06-15 | Stop reason: HOSPADM

## 2025-06-12 RX ORDER — SODIUM CHLORIDE 0.9 % (FLUSH) 0.9 %
5-40 SYRINGE (ML) INJECTION PRN
Status: DISCONTINUED | OUTPATIENT
Start: 2025-06-12 | End: 2025-06-15 | Stop reason: HOSPADM

## 2025-06-12 RX ORDER — DEXTROSE, SODIUM CHLORIDE, SODIUM LACTATE, POTASSIUM CHLORIDE, AND CALCIUM CHLORIDE 5; .6; .31; .03; .02 G/100ML; G/100ML; G/100ML; G/100ML; G/100ML
INJECTION, SOLUTION INTRAVENOUS CONTINUOUS
Status: DISCONTINUED | OUTPATIENT
Start: 2025-06-12 | End: 2025-06-12

## 2025-06-12 RX ORDER — ACETAMINOPHEN 325 MG/1
650 TABLET ORAL EVERY 4 HOURS PRN
Status: DISCONTINUED | OUTPATIENT
Start: 2025-06-12 | End: 2025-06-15 | Stop reason: HOSPADM

## 2025-06-12 RX ORDER — FENTANYL CITRATE 50 UG/ML
INJECTION, SOLUTION INTRAMUSCULAR; INTRAVENOUS
Status: COMPLETED
Start: 2025-06-12 | End: 2025-06-12

## 2025-06-12 RX ORDER — ONDANSETRON 4 MG/1
4 TABLET, ORALLY DISINTEGRATING ORAL EVERY 6 HOURS PRN
Status: DISCONTINUED | OUTPATIENT
Start: 2025-06-12 | End: 2025-06-12 | Stop reason: SDUPTHER

## 2025-06-12 RX ORDER — OXYCODONE HYDROCHLORIDE 5 MG/1
5 TABLET ORAL EVERY 4 HOURS PRN
Status: DISCONTINUED | OUTPATIENT
Start: 2025-06-12 | End: 2025-06-12 | Stop reason: SDUPTHER

## 2025-06-12 RX ORDER — SODIUM CHLORIDE, SODIUM LACTATE, POTASSIUM CHLORIDE, CALCIUM CHLORIDE 600; 310; 30; 20 MG/100ML; MG/100ML; MG/100ML; MG/100ML
INJECTION, SOLUTION INTRAVENOUS CONTINUOUS
Status: DISCONTINUED | OUTPATIENT
Start: 2025-06-12 | End: 2025-06-15 | Stop reason: HOSPADM

## 2025-06-12 RX ORDER — ONDANSETRON 4 MG/1
4 TABLET, ORALLY DISINTEGRATING ORAL EVERY 6 HOURS PRN
Status: DISCONTINUED | OUTPATIENT
Start: 2025-06-12 | End: 2025-06-15 | Stop reason: HOSPADM

## 2025-06-12 RX ORDER — SODIUM CHLORIDE, SODIUM LACTATE, POTASSIUM CHLORIDE, AND CALCIUM CHLORIDE .6; .31; .03; .02 G/100ML; G/100ML; G/100ML; G/100ML
500 INJECTION, SOLUTION INTRAVENOUS PRN
Status: DISCONTINUED | OUTPATIENT
Start: 2025-06-12 | End: 2025-06-15 | Stop reason: HOSPADM

## 2025-06-12 RX ORDER — METHYLERGONOVINE MALEATE 0.2 MG/ML
200 INJECTION INTRAVENOUS PRN
Status: DISCONTINUED | OUTPATIENT
Start: 2025-06-12 | End: 2025-06-15 | Stop reason: HOSPADM

## 2025-06-12 RX ORDER — TRANEXAMIC ACID 10 MG/ML
1000 INJECTION, SOLUTION INTRAVENOUS
Status: DISCONTINUED | OUTPATIENT
Start: 2025-06-12 | End: 2025-06-15 | Stop reason: HOSPADM

## 2025-06-12 RX ORDER — OXYCODONE HYDROCHLORIDE 5 MG/1
5 TABLET ORAL EVERY 4 HOURS PRN
Status: DISCONTINUED | OUTPATIENT
Start: 2025-06-12 | End: 2025-06-15 | Stop reason: HOSPADM

## 2025-06-12 RX ORDER — LANOLIN
CREAM (ML) TOPICAL PRN
Status: DISCONTINUED | OUTPATIENT
Start: 2025-06-12 | End: 2025-06-15 | Stop reason: HOSPADM

## 2025-06-12 RX ORDER — POLYETHYLENE GLYCOL 3350 17 G/17G
17 POWDER, FOR SOLUTION ORAL DAILY
Status: DISCONTINUED | OUTPATIENT
Start: 2025-06-12 | End: 2025-06-15 | Stop reason: HOSPADM

## 2025-06-12 RX ORDER — ONDANSETRON 2 MG/ML
4 INJECTION INTRAMUSCULAR; INTRAVENOUS EVERY 6 HOURS PRN
Status: DISCONTINUED | OUTPATIENT
Start: 2025-06-12 | End: 2025-06-15 | Stop reason: HOSPADM

## 2025-06-12 RX ORDER — SODIUM CHLORIDE 9 MG/ML
INJECTION, SOLUTION INTRAVENOUS PRN
Status: DISCONTINUED | OUTPATIENT
Start: 2025-06-12 | End: 2025-06-12 | Stop reason: SDUPTHER

## 2025-06-12 RX ORDER — SODIUM CHLORIDE 0.9 % (FLUSH) 0.9 %
5-40 SYRINGE (ML) INJECTION EVERY 12 HOURS SCHEDULED
Status: DISCONTINUED | OUTPATIENT
Start: 2025-06-12 | End: 2025-06-15 | Stop reason: HOSPADM

## 2025-06-12 RX ORDER — MISOPROSTOL 200 UG/1
400 TABLET ORAL PRN
Status: DISCONTINUED | OUTPATIENT
Start: 2025-06-12 | End: 2025-06-15 | Stop reason: HOSPADM

## 2025-06-12 RX ORDER — EPHEDRINE SULFATE/0.9% NACL/PF 50 MG/5 ML
SYRINGE (ML) INTRAVENOUS
Status: DISCONTINUED | OUTPATIENT
Start: 2025-06-12 | End: 2025-06-12 | Stop reason: SDUPTHER

## 2025-06-12 RX ORDER — FENTANYL CITRATE 50 UG/ML
INJECTION, SOLUTION INTRAMUSCULAR; INTRAVENOUS
Status: DISCONTINUED | OUTPATIENT
Start: 2025-06-12 | End: 2025-06-12 | Stop reason: SDUPTHER

## 2025-06-12 RX ORDER — IBUPROFEN 800 MG/1
800 TABLET, FILM COATED ORAL EVERY 8 HOURS SCHEDULED
Status: DISCONTINUED | OUTPATIENT
Start: 2025-06-12 | End: 2025-06-15 | Stop reason: HOSPADM

## 2025-06-12 RX ORDER — SODIUM CHLORIDE, SODIUM LACTATE, POTASSIUM CHLORIDE, CALCIUM CHLORIDE 600; 310; 30; 20 MG/100ML; MG/100ML; MG/100ML; MG/100ML
INJECTION, SOLUTION INTRAVENOUS CONTINUOUS
Status: DISCONTINUED | OUTPATIENT
Start: 2025-06-12 | End: 2025-06-12 | Stop reason: SDUPTHER

## 2025-06-12 RX ORDER — ROPIVACAINE HYDROCHLORIDE 2 MG/ML
INJECTION, SOLUTION EPIDURAL; INFILTRATION; PERINEURAL
Status: DISCONTINUED | OUTPATIENT
Start: 2025-06-12 | End: 2025-06-12 | Stop reason: SDUPTHER

## 2025-06-12 RX ORDER — ONDANSETRON 2 MG/ML
4 INJECTION INTRAMUSCULAR; INTRAVENOUS EVERY 6 HOURS PRN
Status: DISCONTINUED | OUTPATIENT
Start: 2025-06-12 | End: 2025-06-12 | Stop reason: SDUPTHER

## 2025-06-12 RX ORDER — CARBOPROST TROMETHAMINE 250 UG/ML
250 INJECTION, SOLUTION INTRAMUSCULAR PRN
Status: DISCONTINUED | OUTPATIENT
Start: 2025-06-12 | End: 2025-06-15 | Stop reason: HOSPADM

## 2025-06-12 RX ORDER — SODIUM CHLORIDE 9 MG/ML
INJECTION, SOLUTION INTRAVENOUS PRN
Status: DISCONTINUED | OUTPATIENT
Start: 2025-06-12 | End: 2025-06-15 | Stop reason: HOSPADM

## 2025-06-12 RX ORDER — DEXTROAMPHETAMINE SACCHARATE, AMPHETAMINE ASPARTATE, DEXTROAMPHETAMINE SULFATE AND AMPHETAMINE SULFATE 2.5; 2.5; 2.5; 2.5 MG/1; MG/1; MG/1; MG/1
20 TABLET ORAL 2 TIMES DAILY
Status: DISCONTINUED | OUTPATIENT
Start: 2025-06-12 | End: 2025-06-15 | Stop reason: HOSPADM

## 2025-06-12 RX ORDER — SODIUM CHLORIDE, SODIUM LACTATE, POTASSIUM CHLORIDE, AND CALCIUM CHLORIDE .6; .31; .03; .02 G/100ML; G/100ML; G/100ML; G/100ML
1000 INJECTION, SOLUTION INTRAVENOUS PRN
Status: DISCONTINUED | OUTPATIENT
Start: 2025-06-12 | End: 2025-06-15 | Stop reason: HOSPADM

## 2025-06-12 RX ORDER — GABAPENTIN 400 MG/1
800 CAPSULE ORAL 3 TIMES DAILY
Status: DISCONTINUED | OUTPATIENT
Start: 2025-06-12 | End: 2025-06-15 | Stop reason: HOSPADM

## 2025-06-12 RX ADMIN — OXYTOCIN 87.3 MILLI-UNITS/MIN: 10 INJECTION, SOLUTION INTRAMUSCULAR; INTRAVENOUS at 10:10

## 2025-06-12 RX ADMIN — FENTANYL CITRATE 100 MCG: 50 INJECTION, SOLUTION INTRAMUSCULAR; INTRAVENOUS at 08:32

## 2025-06-12 RX ADMIN — PENICILLIN G POTASSIUM 5 MILLION UNITS: 5000000 INJECTION, POWDER, FOR SOLUTION INTRAMUSCULAR; INTRAVENOUS at 08:43

## 2025-06-12 RX ADMIN — Medication 166.7 ML: at 10:10

## 2025-06-12 RX ADMIN — ACETAMINOPHEN 1000 MG: 500 TABLET, FILM COATED ORAL at 23:05

## 2025-06-12 RX ADMIN — Medication 10 MG: at 08:40

## 2025-06-12 RX ADMIN — METHYLERGONOVINE MALEATE 200 MCG: 0.2 INJECTION, SOLUTION INTRAMUSCULAR; INTRAVENOUS at 12:13

## 2025-06-12 RX ADMIN — Medication 10 MG: at 08:48

## 2025-06-12 RX ADMIN — SODIUM CHLORIDE, POTASSIUM CHLORIDE, SODIUM LACTATE AND CALCIUM CHLORIDE: 600; 310; 30; 20 INJECTION, SOLUTION INTRAVENOUS at 08:43

## 2025-06-12 RX ADMIN — DEXTROAMPHETAMINE SACCHARATE, AMPHETAMINE ASPARTATE, DEXTROAMPHETAMINE SULFATE AND AMPHETAMINE SULFATE 20 MG: 2.5; 2.5; 2.5; 2.5 TABLET ORAL at 21:39

## 2025-06-12 RX ADMIN — Medication 10 MG: at 08:43

## 2025-06-12 RX ADMIN — IBUPROFEN 800 MG: 800 TABLET ORAL at 18:14

## 2025-06-12 RX ADMIN — ACETAMINOPHEN 1000 MG: 500 TABLET, FILM COATED ORAL at 14:53

## 2025-06-12 RX ADMIN — PHENYLEPHRINE HYDROCHLORIDE 200 MCG: 0.1 INJECTION, SOLUTION INTRAVENOUS at 09:00

## 2025-06-12 RX ADMIN — FLUOXETINE HYDROCHLORIDE 40 MG: 20 CAPSULE ORAL at 23:05

## 2025-06-12 RX ADMIN — SODIUM CHLORIDE, SODIUM LACTATE, POTASSIUM CHLORIDE, AND CALCIUM CHLORIDE 500 ML: .6; .31; .03; .02 INJECTION, SOLUTION INTRAVENOUS at 08:40

## 2025-06-12 RX ADMIN — GABAPENTIN 800 MG: 400 CAPSULE ORAL at 21:39

## 2025-06-12 RX ADMIN — ROPIVACAINE HYDROCHLORIDE 10 ML/HR: 2 INJECTION, SOLUTION EPIDURAL; INFILTRATION at 08:43

## 2025-06-12 NOTE — ANESTHESIA PRE PROCEDURE
Department of Anesthesiology  Preprocedure Note       Name:  Lauren Moctezuma   Age:  35 y.o.  :  1990                                          MRN:  [id number]         Date:  2025      Surgeon: * No surgeons listed *    Procedure: * No procedures listed *    Medications prior to admission:   Prior to Admission medications    Medication Sig Start Date End Date Taking? Authorizing Provider   FLUoxetine (PROZAC) 40 MG capsule Take 1 capsule by mouth daily   Yes Ken Ledesma MD   clonazePAM (KLONOPIN) 2 MG tablet Take 1 tablet by mouth 2 times daily as needed. Takes 1-2 tabs up to twice a day as needed 24  Yes Ken Ledesma MD   gabapentin (NEURONTIN) 800 MG tablet Take 1 tablet by mouth 3 times daily. 24  Yes Ken Ledesma MD   Prenatal Vit w/Ok-Zwlrxwuxh-VX (PNV PO) Take by mouth   Yes Ken Ledesma MD   cefUROXime (CEFTIN) 500 MG tablet  5/15/25   Ken Ledesma MD   oxyCODONE HCl (OXY-IR) 10 MG immediate release tablet Take 1 tablet by mouth every 4 hours as needed for Pain. Takes 1 tab every 4-6 hrs as needed knee and back pain 3/17/25   Ken Ledesma MD   Ferrous Sulfate (IRON PO) Take by mouth    Ken Ledesma MD   amphetamine-dextroamphetamine (ADDERALL) 20 MG tablet Take 1 tablet by mouth 2 times daily. 24   Ken Ledesma MD   ondansetron (ZOFRAN) 8 MG tablet Take 1 tablet by mouth every 8 hours as needed for Nausea or Vomiting 24   Maria L Xiong, APRN - CNP       Current medications:    Current Facility-Administered Medications   Medication Dose Route Frequency Provider Last Rate Last Admin    terbutaline (BRETHINE) injection 0.25 mg  0.25 mg SubCUTAneous Once PRN Severo Conway MD        lactated ringers bolus 500 mL  500 mL IntraVENous PRN Severo Conway MD        Or    lactated ringers bolus 1,000 mL  1,000 mL IntraVENous PRN Severo Conway MD        sodium chloride flush 0.9 %

## 2025-06-12 NOTE — PROGRESS NOTES
Called JEFERSON Ravi and updated social work that mom had delivered here with prescribed meds and testing had been done on mom and baby.  Left message with Ashley (social work) per JEFERSON Ravi request.

## 2025-06-12 NOTE — L&D DELIVERY NOTE
DES Moctezuma [[id number]]      Labor Events     Labor: No   Steroids: None  Cervical Ripening Date/Time:      Antibiotics Received during Labor: No  Rupture Date/Time:  25 10:05:00   Rupture Type: Bulging, SROM  Fluid Color: Meconium  Meconium Consistency: Thin  Labor Complications: None       Anesthesia    Method: Epidural       Labor Event Times      Labor onset date/time:  25 04:30:00 EDT     Dilation complete date/time:        Start pushing date/time:     Decision date/time (emergent ):            Labor Length    3rd stage: 0h 05m       Delivery Details      Delivery Date: 25 Delivery Time: 10:05:00   Delivery Type: Vaginal, Spontaneous               Presentation    Presentation: Vertex  Position: Left  _: Occiput  _: Anterior       Shoulder Dystocia    Shoulder Dystocia Present?: No       Assisted Delivery Details    Forceps Attempted?: No  Vacuum Extractor Attempted?: No                           Cord    Vessels: 3 Vessels  Complications: None  Delayed Cord Clamping?: Yes  Cord Blood Disposition: Lab  Gases Sent?: No              Placenta    Date/Time: 2025 10:10:00  Removal: Spontaneous  Appearance: Intact  Disposition: Discarded       Lacerations    Episiotomy: None  Perineal Lacerations: None  Other Lacerations: no non-perineal laceration       Vaginal Counts    Initial Count Personnel: KELLIE DENSON  Initial Count Verified By: MINDY NGUYỄN RN  Intial Sponge Count: Correct Intial Needles Count: Correct Intial Instruments Count: Correct   Final Sponges Count: Correct Final Needles  Count: Correct Final Instruments Count: Correct   Final Count Personnel: SAME  Final Count Verified By: SAME  Accurate Final Count?: Yes       Blood Loss  Mother: Lauren Moctezuma Osvaldo #[id number]     Start of Mother's Information      Delivery Blood Loss   Intrapartum & Postpartum: 25 043 - 25 1254    Delivery Admission: 25 043 - 25 1254

## 2025-06-12 NOTE — PROGRESS NOTES
Anesthesia at bedside at 0818. Time out completed. Assisted patient to sitting. Epidural completed. Patient tolerated well. Assisted to lying.

## 2025-06-12 NOTE — PROGRESS NOTES
Update Note:    To pt's room for SVE. Pt resting comfortably with epidural in place.    SVE: complete/+1, bulging bag    FHTs: Cat 1, baseline 110, +accels, no decels, mod carrie  Cxns: not tracing well    PCN being administered for +GBSuria.    Plan:  Anticipate vaginal delivery, will likely not be PCN complete.      Avril Acosta MD  [facility]

## 2025-06-12 NOTE — ANESTHESIA PROCEDURE NOTES
CSE Block    Patient location during procedure: OB  Start time: 6/12/2025 9:24 AM  End time: 6/12/2025 9:32 AM  Reason for block: labor epidural  Staffing  Performed: anesthesiologist   Anesthesiologist: Herminio Richards IV, MD  Performed by: Herminio Richards IV, MD  Authorized by: Herminio Richards IV, MD    CSE  Patient position: sitting  Prep: ChloraPrep  Patient monitoring: continuous pulse ox  Approach: midline  Provider prep: mask and sterile gloves  Spinal Needle  Needle type: pencil-tip   Needle gauge: 25 G  Needle length: 3.5 in  Epidural Needle  Injection technique: PAL air  Needle type: Tuohy   Needle gauge: 17 G  Needle length: 3.5 in  Needle insertion depth: 5 cm  Catheter  Catheter type: end hole  Catheter size: 19 G  Catheter at skin depth: 10 cm  Test dose: negative  Assessment  Events: cerebrospinal fluid  Hemodynamics: stable  Additional Notes  SAB with 25mcg fentanyl and 1ml 0.2% ropiv  Preanesthetic Checklist  Completed: patient identified, IV checked, site marked, risks and benefits discussed, surgical/procedural consents, equipment checked, pre-op evaluation, timeout performed, anesthesia consent given, oxygen available and monitors applied/VS acknowledged

## 2025-06-12 NOTE — H&P
History & Physical    Name: Lauren Moctezuma MRN: [id number]  SSN: [id number]    YOB: 1990  Age: 35 y.o.  Sex: female      Subjective:     Reason for Triage visit:  37w1d and contractions    History of Present Illness: Ms. Moctezuma is a 35 y.o.  female  with an estimated gestational age of 37w1d with Estimated Date of Delivery: 25.   Patient presented to hospital with possible precipitous delivery in parking lot but she was able to be moved via wheelchair to L/D. Initial exam was 9 cm dilated.  Her water has not broken yet.  No bleeding. Good FM.       Pregnancy has been complicated by:  Medication exposure 1st trimester- (Adderall, oxycodone, Prozac, neurontin, klonopin)  Bipolar d/o, ADD, Anxiety/depression  [facility]  Anemia  GBS bacteriuria--> tx. In labor  Hx. THC use- NEEDS CORD SEGMENT AT DELIVERY      Patient denies chest pain, fever, headache , shortness of breath, and visual disturbances.    OB History    Para Term  AB Living   4 3 3   3   SAB IAB Ectopic Molar Multiple Live Births       0 3      # Outcome Date GA Lbr Juan/2nd Weight Sex Type Anes PTL Lv   4 Current            3 Term 24 38w4d  3.54 kg M Vag-Spont EPI N MIKE   2 Term 23 39w4d / 00:21 3.681 kg M Vag-Spont EPI N MIKE   1 Term 14 39w0d  3.118 kg F Vag-Spont   MIKE     Past Medical History:   Diagnosis Date    Abnormal Pap smear of cervix     hx HPV +states abn last yr-pt unsure of what trmt she had    ADD (attention deficit disorder)     Anxiety     bipolar    Anxiety and Depression affecting pregnancy  2023    Takes Prozac 30 mg daily.  Klonopin 0.5 mg PRN BID.     23 [facility]: Reports stable mood.   24 [facility]: Reports stable mood.       Depression     History of drug abuse (HCC) 2020    clean since 2018      Mood disorder 2020    *Hx: onset as a child, dx with bipolar. no hospital

## 2025-06-13 LAB
RPR SER QL: NONREACTIVE
T PALLIDUM AB SER QL IA: REACTIVE

## 2025-06-13 PROCEDURE — 6370000000 HC RX 637 (ALT 250 FOR IP): Performed by: STUDENT IN AN ORGANIZED HEALTH CARE EDUCATION/TRAINING PROGRAM

## 2025-06-13 PROCEDURE — 1100000000 HC RM PRIVATE

## 2025-06-13 RX ADMIN — OXYCODONE 5 MG: 5 TABLET ORAL at 14:15

## 2025-06-13 RX ADMIN — GABAPENTIN 800 MG: 400 CAPSULE ORAL at 08:22

## 2025-06-13 RX ADMIN — GABAPENTIN 800 MG: 400 CAPSULE ORAL at 16:31

## 2025-06-13 RX ADMIN — DEXTROAMPHETAMINE SACCHARATE, AMPHETAMINE ASPARTATE, DEXTROAMPHETAMINE SULFATE AND AMPHETAMINE SULFATE 20 MG: 2.5; 2.5; 2.5; 2.5 TABLET ORAL at 20:54

## 2025-06-13 RX ADMIN — IBUPROFEN 800 MG: 800 TABLET ORAL at 08:20

## 2025-06-13 RX ADMIN — ACETAMINOPHEN 1000 MG: 500 TABLET, FILM COATED ORAL at 12:57

## 2025-06-13 RX ADMIN — ACETAMINOPHEN 1000 MG: 500 TABLET, FILM COATED ORAL at 20:54

## 2025-06-13 RX ADMIN — IBUPROFEN 800 MG: 800 TABLET ORAL at 14:14

## 2025-06-13 RX ADMIN — DEXTROAMPHETAMINE SACCHARATE, AMPHETAMINE ASPARTATE, DEXTROAMPHETAMINE SULFATE AND AMPHETAMINE SULFATE 20 MG: 2.5; 2.5; 2.5; 2.5 TABLET ORAL at 08:22

## 2025-06-13 RX ADMIN — POLYETHYLENE GLYCOL 3350 17 G: 17 POWDER, FOR SOLUTION ORAL at 08:20

## 2025-06-13 ASSESSMENT — PAIN DESCRIPTION - LOCATION
LOCATION: BACK
LOCATION: ABDOMEN

## 2025-06-13 ASSESSMENT — PAIN SCALES - GENERAL
PAINLEVEL_OUTOF10: 4
PAINLEVEL_OUTOF10: 2

## 2025-06-13 ASSESSMENT — PAIN DESCRIPTION - DESCRIPTORS: DESCRIPTORS: ACHING;SORE

## 2025-06-13 NOTE — PROGRESS NOTES
Post-Partum Day Number 1 Progress Note    Patient doing well  without significant complaints.  Pain controlled on current medication.  Voiding without difficulty, normal lochia.    Vitals:  /82   Pulse 88   Temp 97.5 °F (36.4 °C)   Resp 18   Ht 1.676 m (5' 6\")   Wt 83 kg (183 lb)   LMP 09/01/2024   SpO2 100%   Breastfeeding Unknown   BMI 29.54 kg/m²     Vital signs stable, afebrile.    Exam:  Patient without distress.  Heart rrr  Lungs cta b&s               Abdomen soft, fundus firm at level of umbilicus, nontender.                  Lower extremities are negative for swelling, cords or tenderness.   No results found for: \"LABABO\"     Lab Results   Component Value Date/Time    ABORH O POSITIVE 06/12/2025 07:59 AM     Lab Results   Component Value Date/Time    HGB 11.4 06/12/2025 07:59 AM         Assessment and Plan:  Patient appears to be having uncomplicated post-partum course.  Continue routine post-delivery care and maternal education.   Bottlefeeding.   37 week delivery.  Will discharge home tomorrow.

## 2025-06-13 NOTE — ANESTHESIA POSTPROCEDURE EVALUATION
Patient: Lauren Moctezuma  MRN: [id number]  YOB: 1990  Date of evaluation: 6/13/2025    Procedure Summary       Date: 06/12/25 Room / Location:     Anesthesia Start: 0818 Anesthesia Stop: 1005    Procedure: Labor Analgesia Diagnosis:     Scheduled Providers:  Responsible Provider: Herminio Richards IV, MD    Anesthesia Type: epidural ASA Status: 2        Anesthesiology Epidural Followup Note    S/p vaginal delivery via continuous labor epidural.  Patient had adequate pain control during labor and delivery, and she is currently without complaints.  No residual motor or sensory deficit.  No apparent anesthetic complications.

## 2025-06-13 NOTE — CARE COORDINATION
Chart reviewed-4th child. Per documentation pt with history of bipolar disorder, ADD, anxiety, depression, and substance abuse. UDS obtained on admission positive for benzodiazepines and amphetamines. Cord and meconium collected on baby.    Per documentation, patient reports taking Adderall 20 mg BID, Oxycodone 10 mg q 4-6hrs PRN (takes daily), Prozac 40 mg qd, Neurontin 800 mg TID, Klonopin 1 mg QID. Medication is prescribed and managed by Dr. Quispe at [facility].    CM met with patient and infant at bedside for assessment. Demographics and PCP were verified.    Patient tells CM that she does have history of bipolar disorder, anxiety, and depression. She denies history of postpartum depression with previous children. Tells CM she took only prozac and klonopin during pregnancy and reports stable mood.    Recent stressor of moving back in with her mother and step-father. She tells CM she is remodeling a trailer to live in. Per patient, she does not have custody of her daughter. Daughter lives with a \"couple friend\" approximately 3 hours away. Her two sons are with their father. She sees them often but they do not stay overnight due to her living situation at this time.  Per patient they share custody of these children.    Patient was seeing a therapist after the birth of her 1st son. She reports this therapist went on maternity leave and never returned. She was not comfortable finding another therapist in the practice at the time so she stopped sessions. She plans to find another postpartum.    Patient given informational packet on pernatal mood and anxiety disorders (resources and education).    Patient denies any further needs from  at this time. She has  contact information should any needs/questions arise.

## 2025-06-14 PROCEDURE — 1100000000 HC RM PRIVATE

## 2025-06-14 PROCEDURE — 6370000000 HC RX 637 (ALT 250 FOR IP): Performed by: STUDENT IN AN ORGANIZED HEALTH CARE EDUCATION/TRAINING PROGRAM

## 2025-06-14 RX ADMIN — GABAPENTIN 800 MG: 400 CAPSULE ORAL at 11:04

## 2025-06-14 RX ADMIN — ACETAMINOPHEN 1000 MG: 500 TABLET, FILM COATED ORAL at 20:56

## 2025-06-14 RX ADMIN — ACETAMINOPHEN 1000 MG: 500 TABLET, FILM COATED ORAL at 12:56

## 2025-06-14 RX ADMIN — IBUPROFEN 800 MG: 800 TABLET ORAL at 09:11

## 2025-06-14 RX ADMIN — GABAPENTIN 800 MG: 400 CAPSULE ORAL at 00:06

## 2025-06-14 RX ADMIN — POLYETHYLENE GLYCOL 3350 17 G: 17 POWDER, FOR SOLUTION ORAL at 09:14

## 2025-06-14 RX ADMIN — IBUPROFEN 800 MG: 800 TABLET ORAL at 18:01

## 2025-06-14 RX ADMIN — GABAPENTIN 800 MG: 400 CAPSULE ORAL at 18:02

## 2025-06-14 RX ADMIN — DEXTROAMPHETAMINE SACCHARATE, AMPHETAMINE ASPARTATE, DEXTROAMPHETAMINE SULFATE AND AMPHETAMINE SULFATE 20 MG: 2.5; 2.5; 2.5; 2.5 TABLET ORAL at 20:56

## 2025-06-14 RX ADMIN — IBUPROFEN 800 MG: 800 TABLET ORAL at 00:06

## 2025-06-14 RX ADMIN — DEXTROAMPHETAMINE SACCHARATE, AMPHETAMINE ASPARTATE, DEXTROAMPHETAMINE SULFATE AND AMPHETAMINE SULFATE 20 MG: 2.5; 2.5; 2.5; 2.5 TABLET ORAL at 09:11

## 2025-06-14 ASSESSMENT — PAIN DESCRIPTION - LOCATION
LOCATION: BACK;HIP
LOCATION: BACK;HIP

## 2025-06-14 ASSESSMENT — PAIN SCALES - GENERAL
PAINLEVEL_OUTOF10: 2
PAINLEVEL_OUTOF10: 2

## 2025-06-14 NOTE — PROGRESS NOTES
RN in to check on patient several times this morning. Pt very drowsy and falling asleep while RN is talking. RR 12/ min. Pt laying down on sofa with male friend rooming in. RN and PCT assisted pt from sofa to bed. Pt falling asleep while speaking with RN but complains of pain. RN gave pt scheduled Motrin and held Neurontin until pt is more awake. Scheduled Adderall given at this time. Pt requests to have \"home meds added\" for pain. States she takes 10 mg of oxy Q 4-6 hours. States she also takes Klonopin Q6 at home. RN notified MD of patient requests and condition. No new orders.

## 2025-06-14 NOTE — PROGRESS NOTES
Post-Partum Day Number 2 Progress Note    Patient doing well  without significant complaints.  Pain controlled on current medication.  Voiding without difficulty, normal lochia.  She reports that she is used to taking oxycodone every 4-6 hours since before her last baby.  Last drug screens did not have opiates.  Baby to special care nursery for likely withdrawal.  Pt is not sure if she wants to go home today.      Vitals:  /85   Pulse 71   Temp 97.7 °F (36.5 °C)   Resp 12   Ht 1.676 m (5' 6\")   Wt 83 kg (183 lb)   LMP 09/01/2024   SpO2 99%   Breastfeeding Unknown   BMI 29.54 kg/m²     Vital signs stable, afebrile.    Exam:  Patient without distress.  Heart rrr  Lungs cta b&s               Abdomen soft, fundus firm at level of umbilicus, nontender.                  Lower extremities are negative for swelling, cords or tenderness.   No results found for: \"LABABO\"     Lab Results   Component Value Date/Time    ABORH O POSITIVE 06/12/2025 07:59 AM     Lab Results   Component Value Date/Time    HGB 11.4 06/12/2025 07:59 AM         Assessment and Plan:  Patient appears to be having uncomplicated post-partum course.  Continue routine post-delivery care and maternal education.   Bottlefeeding.  Polymedicine use.  May discharge home today.  Pain meds written by pcp.

## 2025-06-15 VITALS
HEIGHT: 66 IN | TEMPERATURE: 98.4 F | OXYGEN SATURATION: 95 % | WEIGHT: 183 LBS | SYSTOLIC BLOOD PRESSURE: 128 MMHG | DIASTOLIC BLOOD PRESSURE: 79 MMHG | RESPIRATION RATE: 18 BRPM | BODY MASS INDEX: 29.41 KG/M2 | HEART RATE: 98 BPM

## 2025-06-15 LAB
AMPHET UR QL SCN: POSITIVE
BARBITURATES UR QL SCN: NEGATIVE
BENZODIAZ UR QL: POSITIVE
CANNABINOIDS UR QL SCN: POSITIVE
COCAINE UR QL SCN: NEGATIVE
Lab: ABNORMAL
METHADONE UR QL: NEGATIVE
OPIATES UR QL: NEGATIVE
PCP UR QL: NEGATIVE

## 2025-06-15 PROCEDURE — 6370000000 HC RX 637 (ALT 250 FOR IP): Performed by: STUDENT IN AN ORGANIZED HEALTH CARE EDUCATION/TRAINING PROGRAM

## 2025-06-15 PROCEDURE — 80307 DRUG TEST PRSMV CHEM ANLYZR: CPT

## 2025-06-15 RX ORDER — IBUPROFEN 800 MG/1
800 TABLET, FILM COATED ORAL EVERY 8 HOURS PRN
Qty: 30 TABLET | Refills: 0 | Status: SHIPPED | OUTPATIENT
Start: 2025-06-15

## 2025-06-15 RX ORDER — ACETAMINOPHEN 500 MG
1000 TABLET ORAL EVERY 6 HOURS PRN
Qty: 30 TABLET | Refills: 1 | Status: SHIPPED | OUTPATIENT
Start: 2025-06-15

## 2025-06-15 RX ORDER — SULFAMETHOXAZOLE AND TRIMETHOPRIM 800; 160 MG/1; MG/1
1 TABLET ORAL 2 TIMES DAILY
Qty: 14 TABLET | Refills: 0 | Status: SHIPPED | OUTPATIENT
Start: 2025-06-15 | End: 2025-06-22

## 2025-06-15 RX ADMIN — DEXTROAMPHETAMINE SACCHARATE, AMPHETAMINE ASPARTATE, DEXTROAMPHETAMINE SULFATE AND AMPHETAMINE SULFATE 20 MG: 2.5; 2.5; 2.5; 2.5 TABLET ORAL at 08:36

## 2025-06-15 RX ADMIN — OXYCODONE 5 MG: 5 TABLET ORAL at 02:39

## 2025-06-15 RX ADMIN — IBUPROFEN 800 MG: 800 TABLET ORAL at 02:39

## 2025-06-15 RX ADMIN — IBUPROFEN 800 MG: 800 TABLET ORAL at 14:19

## 2025-06-15 RX ADMIN — GABAPENTIN 800 MG: 400 CAPSULE ORAL at 02:39

## 2025-06-15 ASSESSMENT — PAIN SCALES - GENERAL: PAINLEVEL_OUTOF10: 2

## 2025-06-15 ASSESSMENT — PAIN DESCRIPTION - LOCATION: LOCATION: BACK;HIP

## 2025-06-15 NOTE — PROGRESS NOTES
Post-Partum Day Number 3 Progress Note    Patient doing well  without significant complaints.  Pt up in room with mother / boyfriend and friend packing.  Pain controlled on current medication.  Voiding without difficulty, normal lochia.  Upon arrival today, rn reported that pt was found asleep on top of her baby.  Pt was very drowsy.  Baby was taken to nicu.  Medication bottles were taken from her room and there were multiple pills in rx bottles that were not exact prescription per pharmacy.  Dss / social work were here to interview pt.      Vitals:  /79   Pulse 98   Temp 98.4 °F (36.9 °C)   Resp 18   Ht 1.676 m (5' 6\")   Wt 83 kg (183 lb)   LMP 09/01/2024   SpO2 95%   Breastfeeding Unknown   BMI 29.54 kg/m²     Vital signs stable, afebrile.    Exam:  Patient without distress.   Heart rrr  Lungs cta b&s               Abdomen soft, fundus firm at level of umbilicus, nontender.                  Lower extremities are negative for swelling, cords or tenderness.  Shallow tan ulceration seen on cheek about 1cm on right.     No results found for: \"LABABO\"     Lab Results   Component Value Date/Time    ABORH O POSITIVE 06/12/2025 07:59 AM     Lab Results   Component Value Date/Time    HGB 11.4 06/12/2025 07:59 AM         Assessment and Plan:  hx of multiple chronic rx use with somnolence today.  Concern present for baby's welfare. Baby retained in nicu.     Repeat uds positive for thc today.  This was not present on admission.  Will discharge pt  home.  Baby staying here at least for 24 hours while dss conducts investigation.  Will tx antibiotic for face wound.

## 2025-06-15 NOTE — DISCHARGE INSTRUCTIONS
Discharge instruction to follow:   Activity: Pelvis rest for 6 weeks     No heavy lifting over 15 lbs for 2 weeks     No driving for 2 weeks     No push/pull motion such as sweeping or vacuuming for 2 weeks     No tub baths for 6 weeks    If using sitz bath continue until comfortable stopping.  If using yuly-bottle continue to use until comfortable stopping.  Change sanitary pad after each urination or bowel movement.    Call MD for the following:      Fever over 101 F; pain not relieved by medication; foul smelling vaginal discharge or an increase in vaginal bleeding.       Take medication as prescribed. Follow up with MD as order.

## 2025-06-15 NOTE — DISCHARGE SUMMARY
Obstetrical Discharge Summary     Name: Lauren Moctezuma MRN: [id number]  SSN: [id number]    YOB: 1990  Age: 35 y.o.  Sex: female      Allergies: Patient has no known allergies.    Admit Date: 2025    Discharge Date: 6/15/2025     Admitting Physician: Severo Conway MD     Attending Physician:  Emerita Manzo DO     * Admission Diagnoses: Normal labor [O80, Z37.9]    * Discharge Diagnoses:   Information for the patient's :  DES Moctezuma [[id number]]   @[id number]@     Additional Diagnoses:    Lab Results   Component Value Date/Time    ABORH O POSITIVE 2025 07:59 AM      Immunization History   Administered Date(s) Administered    DTP 1990, 1992, 1992, 1992    DTaP 1996    Hep B, ENGERIX-B, RECOMBIVAX-HB, (age Birth - 19y), IM, 0.5mL 2003    Hib vaccine 1991, 1992, 1992, 1992    Influenza Virus Vaccine 2014, 10/22/2015    MMR, PRIORIX, M-M-R II, (age 12m+), SC, 0.5mL 1992, 1996    Polio OPV 1991, 1992, 1992, 1996    TD 2LF, TDVAX, (age 7y+), IM, 0.5mL 2003    TDaP, ADACEL (age 10y-64y), BOOSTRIX (age 10y+), IM, 0.5mL 2018       * Procedures:              * Discharge Condition: stable     * Hospital Course: Normal hospital course following the delivery.  From pp standpoint, pt stable. Pt stayed an extra day because baby went to nicu for withdrawal.  Baby then came back to pt room and she was found sleeping on top of baby.  Baby stayed in nicu with mother discharged home on ppd #3.      * Disposition: Home    Discharge Medications:      Medication List        START taking these medications      acetaminophen 500 MG tablet  Commonly known as: TYLENOL  Take 2 tablets by mouth every 6 hours as needed for Pain     ibuprofen 800 MG tablet  Commonly known as: ADVIL;MOTRIN  Take 1 tablet by mouth every 8 hours as needed for Pain     sulfamethoxazole-trimethoprim

## 2025-06-15 NOTE — PROGRESS NOTES
RN instructed by House Supervisor, Lay, to give the patients medications back to patient, as she is ready for discharge. Lay, along with RN took patients medications from lockbox and verified that seal was intact and the medication descriptions matched the description of contents signed by pharmacy.     DSS requested for RN and House supervisor, Lay, to open the bag and allow her to photograph the contents of the bag. RN and House supervisor opened the bag and verified contents when DSS was finished photographing patients medication. House supervisor, Lay, and I were present and witnessed the entire process. House supervisor verified medication and placed then in bag and sealed it. RN gave patient sealed bag. Patient verified that these are the medications that RN took to pharmacy and that they belong to her.

## 2025-06-15 NOTE — PROGRESS NOTES
This RN, house supervisor, Lay opened sealed bag from lock box with Halle Adams RN and DSS employee present and verified the patient's 5 medicine bottles that were found at bedside by CARLI Neri. earlier in the day. DSS worker took pictures of the the 5 bottles and the contents inside each bottle, that contained different medications and ones that were not listed on the bottle. One bottle did contain adderall 30mg, that patient does not have a prescription for. One of the medicine bottles contained a red straw, that was folded and contained a white powdery substance. DSS worker provided with a print out of subscribed patient medication list from . After verification was completed, all 5 bottles were placed into another bag and sealed. RN returned the sealed bag to the patient and patient verified those 5 bottles were hers.

## 2025-06-15 NOTE — PROGRESS NOTES
RN in at 1025, mom holding and burping infant. Mom states she just fed baby.      At 1055 RN back in room to find mother face down on the bed. RN did not visualize baby in the bassinet or bed. RN called mothers name with no response. RN called name loudly while gently shaking shoulders and scanning room for infant. No response from mom but respirations were equal and unlabored. She would not wake. Partner sleeping soundly on sofa, breathing steadily, but unresponsive to noise in the room. RN saw baby blanket peeking out from under moms shoulders, RN begin to pull and push mother until infant was uncovered while yelling for help. Additional staff nurse arrived at bedside as nurse was just freeing baby from underneath mom. NICU called to examine baby. Baby pink at this time with no distress noted. Neonatologist Dr. Garcia states RN is to bring baby for STAT exam.      RN and  at bedside to answer any questions from mother regarding baby's transfer to [facility] for exam. Mother with no questions at this time.

## 2025-06-15 NOTE — PROGRESS NOTES
RN in to check on patient. Patient on sofa with partner. RN had difficulty arousing patient at this time. Baby was crying. Patient was too drowsy to sit up or stand. RN held Neurontin at this time and gave scheduled Adderall. Patient had difficulty holding medication and dozed off while attempting to hold medication. RN asked patient if she wanted to wake up a little more before trying to swallow medications and patient stated \"no, I need it to wake up\". Patients speech is slurred. RN inquired if patient was taking any additional home medications. Patient stated that she had no home meds at in the room.     RN looked down at open bag at patients pillow and saw five bottles of medications with patients name on the label and three vapes with two cartridges containing gold liquid at bedside. Patient was agreeable for RN to remove five medication bottles and send to pharmacy. RN immediately placed bottles in biohazard bag and walked them down to pharmacy for verification per policy.     SW notified.

## 2025-06-15 NOTE — CARE COORDINATION
Nadira Christine [phone] [email or web address] arrived at NICU04 and was introduced to RNCM.   Infant safety plan to be established officially tomorrow. Selena to follow up with patient tomorrow and complete home visit. Tentative plan at this time is for infant to discharge to Fabian Armstrong, stated biological father of infant.    Patient given informational packet on  mood & anxiety disorders (resources/education).    Family denies any additional needs from case management at this time.  Family has 's contact information should any needs/questions arise.

## 2025-06-15 NOTE — PROGRESS NOTES
Medications brought to unit in bag sealed and labeled bag by Sonido Rucker CPHT. Medications placed in lockbox in MIU med room by dual signoff for box key.

## 2025-06-15 NOTE — CASE COMMUNICATION
RN, Halle Adams, contacted pharmacy to help identify medications that were found in the patient's possession. Five medication bottles were brought to the pharmacy for identification. All of the bottles had the patient's name on them.     Prescription Bottle #1 labeled as gabapentin 800 mg contained a quantity of 59.5 tablets of gabapentin 800 mg identified by myself using Drug I.D. on [employer].      Prescription Bottle #2 labeled as clonazepam 2 mg contained a half tablet of cyclobenzaprine 10 mg identified by myself using Drug I.D. on [employer].     Prescription Bottle #3 (Rx # [id number]) labeled as Adderall 20 mg contained a quantity of 12 tablets of clonazepam 2 mg identified by myself using Drug I.D. on [employer].     Prescription Bottle #4 (Rx # [id number]) labeled as Adderall 20 mg contained two Prozac 20 mg capsules, one Adderall 30 mg tablet, and half of an Adderall 30 mg tablet from a different .     Prescription Bottle #5 labeled as oxycodone 10 mg contained a half tablet of of oxycodone 10 mg identified by myself using Drug I.D. on [employer]. A red drinking straw with white powder was also found folded in half in the bottle.     All prescription bottles were opened, identified and counted by myself and Sonido Rucker CPHT. After identification, all medications were returned to original prescription bottles in the same manner that they were received. Medication bottles were returned to CARLI Neri after being placed in sealed security bag. An itemized list of identified medications was provided to nursing.     Thank you,    Lesli Calabrese, Anirudh

## 2025-06-15 NOTE — CARE COORDINATION
RNCM met patient at infant's bedside NICU04. Ranjan, friend of patient and reported \"non-biological father of baby\", present during this meeting.     RNCM verified living demographics. Patient initially stated only herself and her mother reside at [facility]. When mentioned made of a stepfather at the same address, patient reported that her step father does live at the address.     Patient denies any substance abuse issues in the past year. Patient reports taking prescription medications during pregnancy that were the cause of a positive UDS.    Patient denies any knowledge of any current [facility] cases. Patient did state that her two sons are living with their father due to a recent court decision and that the recently born infant shares the same father. To the patient's knowledge, no orders have been filed for custody of this infant.     In discussions regarding caring for infant while in hospital, patient stated that she had an infection that was making her lethargic and causing her to forget to set alarms to feed and provide hygiene care to infant.     Given patient's history and concerns regarding providing care to infant, RNJANKI filed a report with [facility]. Decision pending on case status. MD made aware.

## 2025-06-15 NOTE — CARE COORDINATION
RNHalle, called RNCM to notify of inability to arouse patient, resulting in sternal rub. Angie BOYCE reports she will make a personal DSS report supplying details of this encounter. Per RN, a bag with multiple pill bottles was noted at bedside. Angie BOYCE inquired if security should be called to search patient's room. RNCM agreed that search should be performed. RN supervisor notified of findings. Pharmacy is attempting to identify pills at this time. Per Pharmacy, a straw and powdery white substance were noted in the bag as well.    0921: RNCM placed call to DSS to report additional findings. Awaiting investigation decision.

## 2025-06-15 NOTE — CARE COORDINATION
Angie BOYCE called out to staff that assistance was needed. Please see Angie BOYCE's note for details regarding this event. RNCM presented to room to find infant in the arms of Angie BOYCE, responsive. Patient presented with flat affect. Infant transferred to NICU for evaluation. RNCM notified patient that infant will be in NICU and will not discharge today. Patient stated that she did not understand why the infant would not be returned to the bedside. RNCM described to patient that she was found laying on top of her infant. Patient did not speak and starred at RNCM. RNCM asked patient if she had further questions at this time. She stated no. Patient was notified that the doctors can update her after seeing the infant regarding status of infant and that if she did have further questions for RNCM, she can request a  to visit by speaking to her nurse.     11:27am:Nursing Supervisor notified of events  11:44am: RNCM notified DSS of new findings and a new report was submitted for evaluation. Awaiting response.   11:51am: Jackson VAUGHAN notified of NICU admission.

## 2025-06-18 ENCOUNTER — TELEPHONE (OUTPATIENT)
Dept: OBGYN CLINIC | Age: 35
End: 2025-06-18

## 2025-06-18 NOTE — TELEPHONE ENCOUNTER
Patient called to check status of appointment.  She thought she had appt today.  Patient delivered.  Per Dr Manzo d/c not f/u here in 2 weeks.  Today's appt was her old [facility].  Explained to pt.  F/u here in 2 weeks-sooner if has concerns.  All questions answered, patient v/u.

## 2025-06-18 NOTE — CARE COORDINATION
Results of umbilical drug screen received: positive for benzodiazepines (7 aminoclonazepam & clonazepam), Oxycodone, and gabapentine.    Results securely emailed to Nadira Sanford with [facility] ([email or web address]).    KAYLA Valadez, PM-C  [facility]   [phone]

## 2025-06-19 NOTE — CARE COORDINATION
Results of meconium drug screen received: positive for amphetamines, Oxycodone, cannabinoids.     Results securely emailed to Nadira Sanford with [facility] ([email or web address]).     BRITTANIE Valadez-LAZ, [facility]-C  [facility]   [phone]

## 2025-06-27 ENCOUNTER — TELEPHONE (OUTPATIENT)
Dept: OBGYN CLINIC | Age: 35
End: 2025-06-27

## 2025-07-02 NOTE — TELEPHONE ENCOUNTER
Call placed to patient, no answer.  LM for patient to return call to reschedule missed appointment.